# Patient Record
Sex: MALE | Race: WHITE | Employment: FULL TIME | URBAN - METROPOLITAN AREA
[De-identification: names, ages, dates, MRNs, and addresses within clinical notes are randomized per-mention and may not be internally consistent; named-entity substitution may affect disease eponyms.]

---

## 2017-04-07 ENCOUNTER — LAB ENCOUNTER (OUTPATIENT)
Dept: LAB | Age: 62
End: 2017-04-07
Attending: INTERNAL MEDICINE
Payer: COMMERCIAL

## 2017-04-07 DIAGNOSIS — I25.10 CORONARY ATHEROSCLEROSIS OF NATIVE CORONARY ARTERY: Primary | ICD-10-CM

## 2017-04-07 DIAGNOSIS — E78.00 PURE HYPERCHOLESTEROLEMIA: ICD-10-CM

## 2017-04-07 PROCEDURE — 80061 LIPID PANEL: CPT

## 2017-04-07 PROCEDURE — 36415 COLL VENOUS BLD VENIPUNCTURE: CPT

## 2017-04-07 PROCEDURE — 84450 TRANSFERASE (AST) (SGOT): CPT

## 2017-04-07 PROCEDURE — 84460 ALANINE AMINO (ALT) (SGPT): CPT

## 2017-10-11 ENCOUNTER — OFFICE VISIT (OUTPATIENT)
Dept: INTERNAL MEDICINE CLINIC | Facility: CLINIC | Age: 62
End: 2017-10-11

## 2017-10-11 VITALS
HEART RATE: 58 BPM | BODY MASS INDEX: 31.16 KG/M2 | HEIGHT: 65 IN | TEMPERATURE: 98 F | RESPIRATION RATE: 12 BRPM | DIASTOLIC BLOOD PRESSURE: 72 MMHG | WEIGHT: 187 LBS | SYSTOLIC BLOOD PRESSURE: 120 MMHG

## 2017-10-11 DIAGNOSIS — R73.03 PREDIABETES: ICD-10-CM

## 2017-10-11 DIAGNOSIS — E78.00 PURE HYPERCHOLESTEROLEMIA: ICD-10-CM

## 2017-10-11 DIAGNOSIS — Z00.00 ANNUAL PHYSICAL EXAM: Primary | ICD-10-CM

## 2017-10-11 DIAGNOSIS — Z12.5 PROSTATE CANCER SCREENING: ICD-10-CM

## 2017-10-11 DIAGNOSIS — I25.10 CORONARY ARTERY DISEASE INVOLVING NATIVE CORONARY ARTERY OF NATIVE HEART WITHOUT ANGINA PECTORIS: ICD-10-CM

## 2017-10-11 DIAGNOSIS — Z23 NEED FOR VACCINATION: ICD-10-CM

## 2017-10-11 PROCEDURE — 90471 IMMUNIZATION ADMIN: CPT | Performed by: INTERNAL MEDICINE

## 2017-10-11 PROCEDURE — 90686 IIV4 VACC NO PRSV 0.5 ML IM: CPT | Performed by: INTERNAL MEDICINE

## 2017-10-11 PROCEDURE — 99396 PREV VISIT EST AGE 40-64: CPT | Performed by: INTERNAL MEDICINE

## 2017-10-11 RX ORDER — CHLORAL HYDRATE 500 MG
1000 CAPSULE ORAL DAILY
COMMUNITY

## 2017-10-11 NOTE — PROGRESS NOTES
HPI:    Patient ID: Mel Hernandez is a 62year old male. Patient presents today for annual physical.         Review of Systems   Constitutional: Negative for appetite change, chills, fever and unexpected weight change. HENT: Negative.     Eyes: Neg No murmur heard. Pulmonary/Chest: Effort normal and breath sounds normal. No respiratory distress. He has no wheezes. He has no rales. Abdominal: Soft. Bowel sounds are normal. He exhibits no distension and no mass. There is no tenderness.  There is no Imaging & Referrals:  FLULAVAL INFLUENZA VACCINE QUAD PRESERVATIVE FREE 0.5 ML       #[id number]

## 2017-10-23 ENCOUNTER — PRIOR ORIGINAL RECORDS (OUTPATIENT)
Dept: OTHER | Age: 62
End: 2017-10-23

## 2017-10-23 ENCOUNTER — LAB ENCOUNTER (OUTPATIENT)
Dept: LAB | Age: 62
End: 2017-10-23
Attending: INTERNAL MEDICINE
Payer: COMMERCIAL

## 2017-10-23 DIAGNOSIS — Z00.00 ANNUAL PHYSICAL EXAM: ICD-10-CM

## 2017-10-23 DIAGNOSIS — R73.03 PREDIABETES: ICD-10-CM

## 2017-10-23 DIAGNOSIS — Z12.5 PROSTATE CANCER SCREENING: ICD-10-CM

## 2017-10-23 PROCEDURE — 83036 HEMOGLOBIN GLYCOSYLATED A1C: CPT

## 2017-10-23 PROCEDURE — 36415 COLL VENOUS BLD VENIPUNCTURE: CPT

## 2017-10-23 PROCEDURE — 85025 COMPLETE CBC W/AUTO DIFF WBC: CPT

## 2017-10-23 PROCEDURE — 80061 LIPID PANEL: CPT

## 2017-10-23 PROCEDURE — 80053 COMPREHEN METABOLIC PANEL: CPT

## 2017-11-08 ENCOUNTER — MYAURORA ACCOUNT LINK (OUTPATIENT)
Dept: OTHER | Age: 62
End: 2017-11-08

## 2017-11-08 ENCOUNTER — PRIOR ORIGINAL RECORDS (OUTPATIENT)
Dept: OTHER | Age: 62
End: 2017-11-08

## 2017-11-17 LAB
ALBUMIN: 3.8 G/DL
ALKALINE PHOSPHATATE(ALK PHOS): 65 IU/L
ALT (SGPT): 28 U/L
AST (SGOT): 25 U/L
BILIRUBIN TOTAL: 0.7 MG/DL
BUN: 8 MG/DL
CALCIUM: 8.7 MG/DL
CHLORIDE: 108 MEQ/L
CHOLESTEROL, TOTAL: 132 MG/DL
CREATININE, SERUM: 0.65 MG/DL
GLOBULIN: 2.6 G/DL
GLUCOSE: 114 MG/DL
GLUCOSE: 114 MG/DL
HDL CHOLESTEROL: 36 MG/DL
LDL CHOLESTEROL: 70 MG/DL
POTASSIUM, SERUM: 4.1 MEQ/L
PROTEIN, TOTAL: 6.4 G/DL
SGOT (AST): 25 IU/L
SGPT (ALT): 28 IU/L
SODIUM: 141 MEQ/L
TRIGLYCERIDES: 131 MG/DL

## 2018-10-25 ENCOUNTER — OFFICE VISIT (OUTPATIENT)
Dept: INTERNAL MEDICINE CLINIC | Facility: CLINIC | Age: 63
End: 2018-10-25
Payer: COMMERCIAL

## 2018-10-25 VITALS
DIASTOLIC BLOOD PRESSURE: 74 MMHG | BODY MASS INDEX: 31.16 KG/M2 | WEIGHT: 187 LBS | SYSTOLIC BLOOD PRESSURE: 119 MMHG | HEIGHT: 65 IN | HEART RATE: 60 BPM

## 2018-10-25 DIAGNOSIS — E78.00 PURE HYPERCHOLESTEROLEMIA: ICD-10-CM

## 2018-10-25 DIAGNOSIS — H57.9 EYE PROBLEM: ICD-10-CM

## 2018-10-25 DIAGNOSIS — Z00.00 ANNUAL PHYSICAL EXAM: Primary | ICD-10-CM

## 2018-10-25 DIAGNOSIS — I25.10 CAD IN NATIVE ARTERY: ICD-10-CM

## 2018-10-25 DIAGNOSIS — R73.03 PREDIABETES: ICD-10-CM

## 2018-10-25 PROCEDURE — 99396 PREV VISIT EST AGE 40-64: CPT | Performed by: INTERNAL MEDICINE

## 2018-10-25 PROCEDURE — 90471 IMMUNIZATION ADMIN: CPT | Performed by: INTERNAL MEDICINE

## 2018-10-25 PROCEDURE — 90686 IIV4 VACC NO PRSV 0.5 ML IM: CPT | Performed by: INTERNAL MEDICINE

## 2018-10-25 NOTE — PROGRESS NOTES
HPI:    Patient ID: Bradford Alatorre is a 65year old male. Patient presents today for his annual physical.        Review of Systems   Constitutional: Negative. HENT: Negative. Eyes: Negative for visual disturbance.         Tearing of right eye Cardiovascular: Normal rate, regular rhythm, normal heart sounds and intact distal pulses. Exam reveals no gallop and no friction rub. No murmur heard. Pulmonary/Chest: Effort normal and breath sounds normal. No respiratory distress.  He has no wheezes PSA (Screening) [E]      Urinalysis, Routine [E]      Flulaval 6 months and older 0.5 ml Quad PF [[id number]]      Meds This Visit:  Requested Prescriptions      No prescriptions requested or ordered in this encounter       Imaging & Referrals:  Raul Eugene

## 2018-11-21 ENCOUNTER — LAB ENCOUNTER (OUTPATIENT)
Dept: LAB | Age: 63
End: 2018-11-21
Attending: INTERNAL MEDICINE
Payer: COMMERCIAL

## 2018-11-21 ENCOUNTER — PRIOR ORIGINAL RECORDS (OUTPATIENT)
Dept: OTHER | Age: 63
End: 2018-11-21

## 2018-11-21 DIAGNOSIS — Z00.00 ANNUAL PHYSICAL EXAM: ICD-10-CM

## 2018-11-21 PROCEDURE — 36415 COLL VENOUS BLD VENIPUNCTURE: CPT

## 2018-11-21 PROCEDURE — 85025 COMPLETE CBC W/AUTO DIFF WBC: CPT

## 2018-11-21 PROCEDURE — 80053 COMPREHEN METABOLIC PANEL: CPT

## 2018-11-21 PROCEDURE — 80061 LIPID PANEL: CPT

## 2018-11-21 PROCEDURE — 83036 HEMOGLOBIN GLYCOSYLATED A1C: CPT

## 2018-11-21 PROCEDURE — 81003 URINALYSIS AUTO W/O SCOPE: CPT

## 2018-11-27 LAB
ALBUMIN: 3.8 G/DL
ALKALINE PHOSPHATATE(ALK PHOS): 56 IU/L
ALT (SGPT): 33 U/L
AST (SGOT): 26 U/L
BILIRUBIN TOTAL: 0.8 MG/DL
BUN: 12 MG/DL
CALCIUM: 8.5 MG/DL
CHLORIDE: 106 MEQ/L
CHOLESTEROL, TOTAL: 143 MG/DL
CREATININE, SERUM: 0.73 MG/DL
GLUCOSE: 103 MG/DL
GLUCOSE: 103 MG/DL
HDL CHOLESTEROL: 35 MG/DL
HEMATOCRIT: 40.3 %
HEMOGLOBIN A1C: 5.7 %
HEMOGLOBIN: 13.7 G/DL
LDL CHOLESTEROL: 80 MG/DL
NON-HDL CHOLESTEROL: 108 MG/DL
PLATELETS: 174 K/UL
POTASSIUM, SERUM: 4 MEQ/L
PROTEIN, TOTAL: 6 G/DL
RED BLOOD COUNT: 4.4 X 10-6/U
SGOT (AST): 26 IU/L
SGPT (ALT): 33 IU/L
SODIUM: 137 MEQ/L
TRIGLYCERIDES: 140 MG/DL
WHITE BLOOD COUNT: 4.5 X 10-3/U

## 2018-12-05 ENCOUNTER — PRIOR ORIGINAL RECORDS (OUTPATIENT)
Dept: OTHER | Age: 63
End: 2018-12-05

## 2019-02-15 ENCOUNTER — OFFICE VISIT (OUTPATIENT)
Dept: INTERNAL MEDICINE CLINIC | Facility: CLINIC | Age: 64
End: 2019-02-15
Payer: COMMERCIAL

## 2019-02-15 VITALS
BODY MASS INDEX: 31.16 KG/M2 | DIASTOLIC BLOOD PRESSURE: 58 MMHG | HEART RATE: 61 BPM | RESPIRATION RATE: 12 BRPM | WEIGHT: 187 LBS | SYSTOLIC BLOOD PRESSURE: 95 MMHG | HEIGHT: 65 IN | TEMPERATURE: 97 F

## 2019-02-15 DIAGNOSIS — D17.23 LIPOMA OF RIGHT THIGH: Primary | ICD-10-CM

## 2019-02-15 DIAGNOSIS — I83.811 VARICOSE VEINS OF LEG WITH PAIN, RIGHT: ICD-10-CM

## 2019-02-15 PROCEDURE — 99214 OFFICE O/P EST MOD 30 MIN: CPT | Performed by: INTERNAL MEDICINE

## 2019-02-15 PROCEDURE — 99212 OFFICE O/P EST SF 10 MIN: CPT | Performed by: INTERNAL MEDICINE

## 2019-02-15 NOTE — PROGRESS NOTES
HPI:    Patient ID: Harry Avery is a 59year old male. Mass   This is a chronic (initially had one lump on the back of the thigh and now became 2) problem. The current episode started more than 1 year ago. The problem occurs constantly.  The proble Ear: External ear normal.   Left Ear: External ear normal.   Nose: Nose normal.   Mouth/Throat: Oropharynx is clear and moist. No oropharyngeal exudate. Eyes: Conjunctivae are normal. Right eye exhibits no discharge. Left eye exhibits no discharge.  No sc encounter       Imaging & Referrals:  None       #[id number]

## 2019-02-28 VITALS
HEIGHT: 65 IN | DIASTOLIC BLOOD PRESSURE: 52 MMHG | WEIGHT: 185 LBS | SYSTOLIC BLOOD PRESSURE: 102 MMHG | BODY MASS INDEX: 30.82 KG/M2 | OXYGEN SATURATION: 95 % | HEART RATE: 84 BPM | RESPIRATION RATE: 19 BRPM

## 2019-02-28 VITALS
HEIGHT: 65 IN | SYSTOLIC BLOOD PRESSURE: 118 MMHG | WEIGHT: 187 LBS | DIASTOLIC BLOOD PRESSURE: 70 MMHG | RESPIRATION RATE: 18 BRPM | BODY MASS INDEX: 31.16 KG/M2 | HEART RATE: 55 BPM

## 2019-04-08 RX ORDER — BISOPROLOL FUMARATE 5 MG/1
5 TABLET, FILM COATED ORAL DAILY
COMMUNITY
Start: 2018-12-27 | End: 2020-03-06 | Stop reason: SDUPTHER

## 2019-04-08 RX ORDER — ASPIRIN 81 MG/1
81 TABLET ORAL DAILY
COMMUNITY
Start: 2017-10-16

## 2019-04-08 RX ORDER — ATORVASTATIN CALCIUM 40 MG/1
40 TABLET, FILM COATED ORAL NIGHTLY
COMMUNITY
Start: 2018-12-27 | End: 2020-03-06 | Stop reason: SDUPTHER

## 2019-04-08 RX ORDER — ENALAPRIL MALEATE 2.5 MG/1
2.5 TABLET ORAL DAILY
COMMUNITY
Start: 2018-04-30 | End: 2019-12-09 | Stop reason: SDUPTHER

## 2019-04-08 RX ORDER — HYDROGEN PEROXIDE 2.65 ML/100ML
LIQUID ORAL; TOPICAL
Qty: 90 TABLET | Refills: 5 | Status: SHIPPED | OUTPATIENT
Start: 2019-04-08 | End: 2019-12-09 | Stop reason: SDUPTHER

## 2019-04-08 RX ORDER — ENALAPRIL MALEATE 2.5 MG/1
TABLET ORAL
Qty: 90 TABLET | Refills: 1 | Status: SHIPPED | OUTPATIENT
Start: 2019-04-08 | End: 2020-03-06 | Stop reason: SDUPTHER

## 2019-04-18 RX ORDER — DOCUSATE SODIUM 50 MG/5ML
LIQUID ORAL
COMMUNITY

## 2019-11-27 ENCOUNTER — OFFICE VISIT (OUTPATIENT)
Dept: INTERNAL MEDICINE CLINIC | Facility: CLINIC | Age: 64
End: 2019-11-27
Payer: COMMERCIAL

## 2019-11-27 VITALS
BODY MASS INDEX: 30.49 KG/M2 | HEIGHT: 65 IN | WEIGHT: 183 LBS | SYSTOLIC BLOOD PRESSURE: 117 MMHG | TEMPERATURE: 97 F | HEART RATE: 98 BPM | DIASTOLIC BLOOD PRESSURE: 62 MMHG

## 2019-11-27 DIAGNOSIS — Z01.00 DIABETIC EYE EXAM (HCC): ICD-10-CM

## 2019-11-27 DIAGNOSIS — I25.10 CORONARY ARTERY DISEASE INVOLVING NATIVE CORONARY ARTERY OF NATIVE HEART WITHOUT ANGINA PECTORIS: ICD-10-CM

## 2019-11-27 DIAGNOSIS — Z00.00 ANNUAL PHYSICAL EXAM: Primary | ICD-10-CM

## 2019-11-27 DIAGNOSIS — E11.9 CONTROLLED TYPE 2 DIABETES MELLITUS WITHOUT COMPLICATION, WITHOUT LONG-TERM CURRENT USE OF INSULIN (HCC): ICD-10-CM

## 2019-11-27 DIAGNOSIS — E78.00 PURE HYPERCHOLESTEROLEMIA: ICD-10-CM

## 2019-11-27 DIAGNOSIS — E11.9 DIABETIC EYE EXAM (HCC): ICD-10-CM

## 2019-11-27 PROCEDURE — 90686 IIV4 VACC NO PRSV 0.5 ML IM: CPT | Performed by: INTERNAL MEDICINE

## 2019-11-27 PROCEDURE — 90471 IMMUNIZATION ADMIN: CPT | Performed by: INTERNAL MEDICINE

## 2019-11-27 PROCEDURE — 99396 PREV VISIT EST AGE 40-64: CPT | Performed by: INTERNAL MEDICINE

## 2019-11-27 NOTE — PROGRESS NOTES
HPI:    Patient ID: Dwayne Casillas is a 62year old male. Patient presents today for her annual physical.       Review of Systems   Constitutional: Negative. HENT: Negative. Eyes: Negative.     Respiratory: Negative for cough, shortness of breat rhythm, normal heart sounds and intact distal pulses. Exam reveals no gallop. No murmur heard. Pulmonary/Chest: Effort normal and breath sounds normal. No respiratory distress. He has no wheezes. He has no rales. Abdominal: Soft.  Bowel sounds are norm Imaging & Referrals:  FLULAVAL INFLUENZA VACCINE QUAD PRESERVATIVE FREE 0.5 ML  OPHTHALMOLOGY - INTERNAL       JQ#[id number]

## 2019-12-03 ENCOUNTER — LAB ENCOUNTER (OUTPATIENT)
Dept: LAB | Age: 64
End: 2019-12-03
Attending: INTERNAL MEDICINE
Payer: COMMERCIAL

## 2019-12-03 DIAGNOSIS — Z00.00 ANNUAL PHYSICAL EXAM: ICD-10-CM

## 2019-12-03 LAB
ALBUMIN SERPL-MCNC: 3.7 G/DL
ALBUMIN/GLOB SERPL: NORMAL {RATIO}
ALP SERPL-CCNC: 81 U/L
ALT SERPL-CCNC: 35 U/L
ANION GAP SERPL CALC-SCNC: NORMAL MMOL/L
AST SERPL-CCNC: 23 U/L
BILIRUB SERPL-MCNC: 0.7 MG/DL
BUN SERPL-MCNC: 11 MG/DL
BUN/CREAT SERPL: NORMAL
CALCIUM SERPL-MCNC: 8.6 MG/DL
CHLORIDE SERPL-SCNC: 107 MMOL/L
CHOLEST SERPL-MCNC: 153 MG/DL
CHOLEST/HDLC SERPL: NORMAL {RATIO}
CO2 SERPL-SCNC: NORMAL MMOL/L
CREAT SERPL-MCNC: 0.68 MG/DL
GLOBULIN SER-MCNC: 2.9 G/DL
GLUCOSE SERPL-MCNC: 106 MG/DL
HDLC SERPL-MCNC: 39 MG/DL
LDLC SERPL CALC-MCNC: 77 MG/DL
LENGTH OF FAST TIME PATIENT: NORMAL H
LENGTH OF FAST TIME PATIENT: NORMAL H
NONHDLC SERPL-MCNC: 114 MG/DL
POTASSIUM SERPL-SCNC: 4.3 MMOL/L
PROT SERPL-MCNC: 6.6 G/DL
SODIUM SERPL-SCNC: 140 MMOL/L
TRIGL SERPL-MCNC: 184 MG/DL
VLDLC SERPL CALC-MCNC: NORMAL MG/DL

## 2019-12-03 PROCEDURE — 80053 COMPREHEN METABOLIC PANEL: CPT

## 2019-12-03 PROCEDURE — 80061 LIPID PANEL: CPT

## 2019-12-03 PROCEDURE — 85025 COMPLETE CBC W/AUTO DIFF WBC: CPT

## 2019-12-03 PROCEDURE — 36415 COLL VENOUS BLD VENIPUNCTURE: CPT

## 2019-12-03 PROCEDURE — 83036 HEMOGLOBIN GLYCOSYLATED A1C: CPT

## 2019-12-06 PROBLEM — E11.9 DIABETES (CMD): Status: ACTIVE | Noted: 2019-12-06

## 2019-12-06 PROBLEM — I50.9 CONGESTIVE HEART FAILURE (CHF) (CMD): Status: ACTIVE | Noted: 2019-12-06

## 2019-12-06 PROBLEM — Z95.1 S/P CABG (CORONARY ARTERY BYPASS GRAFT): Status: ACTIVE | Noted: 2019-12-06

## 2019-12-06 PROBLEM — I10 ESSENTIAL HYPERTENSION: Status: ACTIVE | Noted: 2019-12-06

## 2019-12-06 PROBLEM — I25.10 CAD (CORONARY ARTERY DISEASE): Status: ACTIVE | Noted: 2019-12-06

## 2019-12-06 PROBLEM — E78.5 DYSLIPIDEMIA: Status: ACTIVE | Noted: 2019-12-06

## 2019-12-06 PROBLEM — I42.0 DILATED CARDIOMYOPATHY (CMD): Status: ACTIVE | Noted: 2019-12-06

## 2019-12-09 ENCOUNTER — OFFICE VISIT (OUTPATIENT)
Dept: CARDIOLOGY | Age: 64
End: 2019-12-09

## 2019-12-09 VITALS
RESPIRATION RATE: 18 BRPM | SYSTOLIC BLOOD PRESSURE: 94 MMHG | HEART RATE: 74 BPM | WEIGHT: 181 LBS | HEIGHT: 65 IN | DIASTOLIC BLOOD PRESSURE: 56 MMHG | OXYGEN SATURATION: 97 % | BODY MASS INDEX: 30.16 KG/M2

## 2019-12-09 DIAGNOSIS — E78.5 DYSLIPIDEMIA: Primary | ICD-10-CM

## 2019-12-09 PROCEDURE — 3074F SYST BP LT 130 MM HG: CPT | Performed by: INTERNAL MEDICINE

## 2019-12-09 PROCEDURE — 3078F DIAST BP <80 MM HG: CPT | Performed by: INTERNAL MEDICINE

## 2019-12-09 PROCEDURE — 99214 OFFICE O/P EST MOD 30 MIN: CPT | Performed by: INTERNAL MEDICINE

## 2019-12-09 RX ORDER — CHLORAL HYDRATE 500 MG
2 CAPSULE ORAL DAILY
COMMUNITY

## 2019-12-09 ASSESSMENT — PATIENT HEALTH QUESTIONNAIRE - PHQ9
2. FEELING DOWN, DEPRESSED OR HOPELESS: NOT AT ALL
SUM OF ALL RESPONSES TO PHQ9 QUESTIONS 1 AND 2: 0
1. LITTLE INTEREST OR PLEASURE IN DOING THINGS: NOT AT ALL
SUM OF ALL RESPONSES TO PHQ9 QUESTIONS 1 AND 2: 0

## 2019-12-10 DIAGNOSIS — R73.01 ELEVATED FASTING GLUCOSE: Primary | ICD-10-CM

## 2020-03-06 RX ORDER — ENALAPRIL MALEATE 2.5 MG/1
TABLET ORAL
Qty: 90 TABLET | Refills: 2 | Status: SHIPPED | OUTPATIENT
Start: 2020-03-06 | End: 2021-05-28

## 2020-03-06 RX ORDER — BISOPROLOL FUMARATE 5 MG/1
TABLET, FILM COATED ORAL
Qty: 90 TABLET | Refills: 2 | Status: SHIPPED | OUTPATIENT
Start: 2020-03-06 | End: 2021-02-15

## 2020-03-06 RX ORDER — ATORVASTATIN CALCIUM 40 MG/1
TABLET, FILM COATED ORAL
Qty: 90 TABLET | Refills: 2 | Status: SHIPPED | OUTPATIENT
Start: 2020-03-06 | End: 2020-11-13

## 2020-04-02 ENCOUNTER — TELEPHONE (OUTPATIENT)
Dept: INTERNAL MEDICINE CLINIC | Facility: CLINIC | Age: 65
End: 2020-04-02

## 2020-04-02 NOTE — TELEPHONE ENCOUNTER
Patient's daughter called looking to see if her father can get a note for his work to take 2 weeks off work due to current events with the [employer]. He is Cardiac patient who had a Quadruple Bypass surgery at [facility] a few years ago.     He is worri

## 2020-04-15 ENCOUNTER — TELEPHONE (OUTPATIENT)
Dept: INTERNAL MEDICINE CLINIC | Facility: CLINIC | Age: 65
End: 2020-04-15

## 2020-04-15 NOTE — TELEPHONE ENCOUNTER
Patient's daughter Tanvi Garcia states patient works at [employer] and is requesting to stay home an additional 2 weeks from work due to him being considered high risk for COVID-19. Patient is requesting note to start on 04/17/2020 until 05/01/2020. Please advise.

## 2020-11-13 RX ORDER — ATORVASTATIN CALCIUM 40 MG/1
TABLET, FILM COATED ORAL
Qty: 90 TABLET | Refills: 0 | Status: SHIPPED | OUTPATIENT
Start: 2020-11-13 | End: 2021-02-15

## 2020-12-02 ENCOUNTER — OFFICE VISIT (OUTPATIENT)
Dept: INTERNAL MEDICINE CLINIC | Facility: CLINIC | Age: 65
End: 2020-12-02
Payer: COMMERCIAL

## 2020-12-02 ENCOUNTER — LAB ENCOUNTER (OUTPATIENT)
Dept: LAB | Age: 65
End: 2020-12-02
Attending: INTERNAL MEDICINE
Payer: COMMERCIAL

## 2020-12-02 VITALS
SYSTOLIC BLOOD PRESSURE: 110 MMHG | BODY MASS INDEX: 31.07 KG/M2 | DIASTOLIC BLOOD PRESSURE: 59 MMHG | HEIGHT: 65 IN | HEART RATE: 58 BPM | WEIGHT: 186.5 LBS | TEMPERATURE: 97 F

## 2020-12-02 DIAGNOSIS — E78.00 PURE HYPERCHOLESTEROLEMIA: ICD-10-CM

## 2020-12-02 DIAGNOSIS — Z00.00 ANNUAL PHYSICAL EXAM: Primary | ICD-10-CM

## 2020-12-02 DIAGNOSIS — R73.03 PREDIABETES: ICD-10-CM

## 2020-12-02 DIAGNOSIS — I25.10 CORONARY ARTERY DISEASE INVOLVING NATIVE CORONARY ARTERY OF NATIVE HEART WITHOUT ANGINA PECTORIS: ICD-10-CM

## 2020-12-02 DIAGNOSIS — Z00.00 ANNUAL PHYSICAL EXAM: ICD-10-CM

## 2020-12-02 LAB
ALBUMIN SERPL-MCNC: 3.9 G/DL
ALBUMIN/GLOB SERPL: 1.3 {RATIO}
ALP SERPL-CCNC: 89 U/L
ALT SERPL-CCNC: 44 UNITS/L
ANION GAP SERPL CALC-SCNC: 5 MMOL/L
AST SERPL-CCNC: 22 UNITS/L
BILIRUB SERPL-MCNC: 1 MG/DL
BUN SERPL-MCNC: 23 MG/DL
BUN/CREAT SERPL: 29.5
CALCIUM SERPL-MCNC: 8.8 MG/DL
CHLORIDE SERPL-SCNC: 110 MMOL/L
CHOLEST SERPL-MCNC: 144 MG/DL
CO2 SERPL-SCNC: 28 MMOL/L
CREAT SERPL-MCNC: 0.78 MG/DL
GLOBULIN SER-MCNC: 3.1 G/DL
GLUCOSE SERPL-MCNC: 107 MG/DL
HCT VFR BLD CALC: 42.7 %
HDLC SERPL-MCNC: 42 MG/DL
HGB BLD-MCNC: 14.3 G/DL
LDLC SERPL CALC-MCNC: 73 MG/DL
LENGTH OF FAST TIME PATIENT: YES H
LENGTH OF FAST TIME PATIENT: YES H
MCH RBC QN AUTO: 31.5 PG
MCHC RBC AUTO-ENTMCNC: 33.5 G/DL
MCV RBC AUTO: 94.1 FL
NONHDLC SERPL-MCNC: 102 MG/DL
PLATELET # BLD: 189 K/MCL
POTASSIUM SERPL-SCNC: 4.2 MMOL/L
PROT SERPL-MCNC: 7 G/DL
RBC # BLD: 4.54 10*6/UL
SODIUM SERPL-SCNC: 143 MMOL/L
TRIGL SERPL-MCNC: 146 MG/DL
VLDLC SERPL CALC-MCNC: 29 MG/DL
WBC # BLD: 4.6 K/MCL

## 2020-12-02 PROCEDURE — 99396 PREV VISIT EST AGE 40-64: CPT | Performed by: INTERNAL MEDICINE

## 2020-12-02 PROCEDURE — 80061 LIPID PANEL: CPT

## 2020-12-02 PROCEDURE — 36415 COLL VENOUS BLD VENIPUNCTURE: CPT

## 2020-12-02 PROCEDURE — 80053 COMPREHEN METABOLIC PANEL: CPT

## 2020-12-02 PROCEDURE — 83036 HEMOGLOBIN GLYCOSYLATED A1C: CPT

## 2020-12-02 PROCEDURE — 3074F SYST BP LT 130 MM HG: CPT | Performed by: INTERNAL MEDICINE

## 2020-12-02 PROCEDURE — 3078F DIAST BP <80 MM HG: CPT | Performed by: INTERNAL MEDICINE

## 2020-12-02 PROCEDURE — 3008F BODY MASS INDEX DOCD: CPT | Performed by: INTERNAL MEDICINE

## 2020-12-02 PROCEDURE — 85025 COMPLETE CBC W/AUTO DIFF WBC: CPT

## 2020-12-02 NOTE — PROGRESS NOTES
HPI:    Patient ID: Linda Jaime is a 60year old male. Patient comes in today for his annual physical.  Otherwise has no specific complaints. Has a known history of CAD status post CABG, hyperlipidemia and prediabetes.       Review of Systems   Co Packs/day: 1.00        Years: 30.00        Pack years: 30        Types: Cigarettes      Smokeless tobacco: Never Used    Alcohol use: Yes      Alcohol/week: 0.0 standard drinks      Frequency: Monthly or less      Comment: occasionally    Drug use:  No exam  (primary encounter diagnosis)  Plan: CBC WITH DIFFERENTIAL WITH PLATELET, COMP         METABOLIC PANEL (14), HEMOGLOBIN A1C, LIPID         PANEL, PSA SCREEN        Routine labs have been ordered.   Patient states that he is ready had his flu shot from

## 2020-12-23 ENCOUNTER — OFFICE VISIT (OUTPATIENT)
Dept: CARDIOLOGY | Age: 65
End: 2020-12-23

## 2020-12-23 VITALS
DIASTOLIC BLOOD PRESSURE: 54 MMHG | WEIGHT: 184 LBS | BODY MASS INDEX: 30.66 KG/M2 | HEART RATE: 65 BPM | RESPIRATION RATE: 18 BRPM | SYSTOLIC BLOOD PRESSURE: 100 MMHG | OXYGEN SATURATION: 96 % | HEIGHT: 65 IN

## 2020-12-23 DIAGNOSIS — I25.10 CORONARY ARTERY DISEASE INVOLVING NATIVE CORONARY ARTERY OF NATIVE HEART WITHOUT ANGINA PECTORIS: Primary | ICD-10-CM

## 2020-12-23 DIAGNOSIS — E78.5 DYSLIPIDEMIA: ICD-10-CM

## 2020-12-23 PROCEDURE — 3078F DIAST BP <80 MM HG: CPT | Performed by: INTERNAL MEDICINE

## 2020-12-23 PROCEDURE — 3074F SYST BP LT 130 MM HG: CPT | Performed by: INTERNAL MEDICINE

## 2020-12-23 PROCEDURE — 99214 OFFICE O/P EST MOD 30 MIN: CPT | Performed by: INTERNAL MEDICINE

## 2020-12-23 ASSESSMENT — PATIENT HEALTH QUESTIONNAIRE - PHQ9
2. FEELING DOWN, DEPRESSED OR HOPELESS: NOT AT ALL
SUM OF ALL RESPONSES TO PHQ9 QUESTIONS 1 AND 2: 0
1. LITTLE INTEREST OR PLEASURE IN DOING THINGS: NOT AT ALL
CLINICAL INTERPRETATION OF PHQ2 SCORE: NO FURTHER SCREENING NEEDED
SUM OF ALL RESPONSES TO PHQ9 QUESTIONS 1 AND 2: 0
CLINICAL INTERPRETATION OF PHQ9 SCORE: NO FURTHER SCREENING NEEDED

## 2021-02-15 RX ORDER — BISOPROLOL FUMARATE 5 MG/1
TABLET, FILM COATED ORAL
Qty: 90 TABLET | Refills: 2 | Status: SHIPPED | OUTPATIENT
Start: 2021-02-15

## 2021-02-15 RX ORDER — ATORVASTATIN CALCIUM 40 MG/1
TABLET, FILM COATED ORAL
Qty: 90 TABLET | Refills: 2 | Status: SHIPPED | OUTPATIENT
Start: 2021-02-15

## 2021-05-28 RX ORDER — ENALAPRIL MALEATE 2.5 MG/1
TABLET ORAL
Qty: 90 TABLET | Refills: 2 | Status: SHIPPED | OUTPATIENT
Start: 2021-05-28

## 2021-12-21 ENCOUNTER — TELEPHONE (OUTPATIENT)
Dept: INTERNAL MEDICINE CLINIC | Facility: CLINIC | Age: 66
End: 2021-12-21

## 2021-12-21 NOTE — TELEPHONE ENCOUNTER
Daughter, phoned back and did schedule an appt for the patient to see Dr. Jackie Esquivel on 12-23-21 at 9:30. This was approved per the doctors message below.

## 2021-12-21 NOTE — TELEPHONE ENCOUNTER
1st attempt/left voice mail message for pt to call back. Please, see message below when the call is returned.

## 2021-12-21 NOTE — TELEPHONE ENCOUNTER
Talked to Machado Balkenyetta daughter of patient she will tell her mom to call office, see message below.

## 2021-12-23 ENCOUNTER — LAB ENCOUNTER (OUTPATIENT)
Dept: LAB | Age: 66
End: 2021-12-23
Attending: INTERNAL MEDICINE
Payer: MEDICARE

## 2021-12-23 ENCOUNTER — OFFICE VISIT (OUTPATIENT)
Dept: INTERNAL MEDICINE CLINIC | Facility: CLINIC | Age: 66
End: 2021-12-23
Payer: MEDICARE

## 2021-12-23 VITALS
WEIGHT: 188.31 LBS | TEMPERATURE: 98 F | OXYGEN SATURATION: 99 % | DIASTOLIC BLOOD PRESSURE: 71 MMHG | BODY MASS INDEX: 31.38 KG/M2 | HEIGHT: 65 IN | HEART RATE: 60 BPM | SYSTOLIC BLOOD PRESSURE: 119 MMHG

## 2021-12-23 DIAGNOSIS — Z00.00 MEDICARE ANNUAL WELLNESS VISIT, INITIAL: Primary | ICD-10-CM

## 2021-12-23 DIAGNOSIS — Z87.891 HISTORY OF SMOKING 30 OR MORE PACK YEARS: ICD-10-CM

## 2021-12-23 DIAGNOSIS — I83.811 VARICOSE VEINS OF LEG WITH PAIN, RIGHT: ICD-10-CM

## 2021-12-23 DIAGNOSIS — E78.5 HYPERLIPIDEMIA ASSOCIATED WITH TYPE 2 DIABETES MELLITUS (HCC): ICD-10-CM

## 2021-12-23 DIAGNOSIS — Z12.5 PROSTATE CANCER SCREENING: ICD-10-CM

## 2021-12-23 DIAGNOSIS — I25.10 CORONARY ARTERY DISEASE INVOLVING NATIVE CORONARY ARTERY OF NATIVE HEART WITHOUT ANGINA PECTORIS: ICD-10-CM

## 2021-12-23 DIAGNOSIS — Z01.00 DIABETIC EYE EXAM (HCC): ICD-10-CM

## 2021-12-23 DIAGNOSIS — Z00.00 MEDICARE ANNUAL WELLNESS VISIT, INITIAL: ICD-10-CM

## 2021-12-23 DIAGNOSIS — E11.69 HYPERLIPIDEMIA ASSOCIATED WITH TYPE 2 DIABETES MELLITUS (HCC): ICD-10-CM

## 2021-12-23 DIAGNOSIS — Z13.6 SCREENING FOR AAA (AORTIC ABDOMINAL ANEURYSM): ICD-10-CM

## 2021-12-23 DIAGNOSIS — E11.9 DIABETIC EYE EXAM (HCC): ICD-10-CM

## 2021-12-23 DIAGNOSIS — E11.9 CONTROLLED TYPE 2 DIABETES MELLITUS WITHOUT COMPLICATION, WITHOUT LONG-TERM CURRENT USE OF INSULIN (HCC): ICD-10-CM

## 2021-12-23 PROBLEM — E78.00 PURE HYPERCHOLESTEROLEMIA: Status: RESOLVED | Noted: 2018-10-25 | Resolved: 2021-12-23

## 2021-12-23 PROBLEM — H57.9 EYE PROBLEM: Status: RESOLVED | Noted: 2018-10-25 | Resolved: 2021-12-23

## 2021-12-23 PROCEDURE — 82570 ASSAY OF URINE CREATININE: CPT

## 2021-12-23 PROCEDURE — 82043 UR ALBUMIN QUANTITATIVE: CPT

## 2021-12-23 PROCEDURE — G0009 ADMIN PNEUMOCOCCAL VACCINE: HCPCS | Performed by: INTERNAL MEDICINE

## 2021-12-23 PROCEDURE — 3078F DIAST BP <80 MM HG: CPT | Performed by: INTERNAL MEDICINE

## 2021-12-23 PROCEDURE — 99397 PER PM REEVAL EST PAT 65+ YR: CPT | Performed by: INTERNAL MEDICINE

## 2021-12-23 PROCEDURE — 3074F SYST BP LT 130 MM HG: CPT | Performed by: INTERNAL MEDICINE

## 2021-12-23 PROCEDURE — 80053 COMPREHEN METABOLIC PANEL: CPT

## 2021-12-23 PROCEDURE — 80061 LIPID PANEL: CPT

## 2021-12-23 PROCEDURE — 96160 PT-FOCUSED HLTH RISK ASSMT: CPT | Performed by: INTERNAL MEDICINE

## 2021-12-23 PROCEDURE — 3008F BODY MASS INDEX DOCD: CPT | Performed by: INTERNAL MEDICINE

## 2021-12-23 PROCEDURE — 85025 COMPLETE CBC W/AUTO DIFF WBC: CPT

## 2021-12-23 PROCEDURE — G0402 INITIAL PREVENTIVE EXAM: HCPCS | Performed by: INTERNAL MEDICINE

## 2021-12-23 PROCEDURE — G0008 ADMIN INFLUENZA VIRUS VAC: HCPCS | Performed by: INTERNAL MEDICINE

## 2021-12-23 PROCEDURE — 36415 COLL VENOUS BLD VENIPUNCTURE: CPT

## 2021-12-23 PROCEDURE — 90732 PPSV23 VACC 2 YRS+ SUBQ/IM: CPT | Performed by: INTERNAL MEDICINE

## 2021-12-23 PROCEDURE — 90662 IIV NO PRSV INCREASED AG IM: CPT | Performed by: INTERNAL MEDICINE

## 2021-12-23 PROCEDURE — 83036 HEMOGLOBIN GLYCOSYLATED A1C: CPT

## 2021-12-23 NOTE — PROGRESS NOTES
HPI:   Mel Hernandez is a 73year old male who presents for a Medicare Subsequent Annual Wellness visit (Pt already had Initial Annual Wellness). No topic due editable text        He has been screened for Falls and is low risk.     Cognitive Assess of it for scanning into Epic. He smoked tobacco in the past but quit greater than 12 months ago.   Social History    Tobacco Use      Smoking status: Former Smoker        Packs/day: 1.00        Years: 30.00        Pack years: 30        Types: Cigarett MOUTH EVERY DAY  Bisoprolol Fumarate (ZEBETA) 5 MG Oral Tab, Take 1 tablet by mouth daily. docusate sodium 100 MG Oral Cap, Take 2 capsules by mouth daily. aspirin 81 MG Oral Tab, Take 81 mg by mouth daily.        MEDICAL INFORMATION:   He  has a past med (85.4 kg).     Medicare Hearing Assessment  (Required for AWV/SWV)    Hearing Screening    Time taken: 12/23/2021  9:09 AM  Screening Method: Questionnaire  I have a problem hearing over the telephone: No I have trouble following the conversations when two no adenopathy, thyroid: not enlarged, symmetric, no tenderness/mass/nodules, no carotid bruit or JVD   Back:   Symmetric, no curvature, ROM normal, no CVA tenderness   Lungs:   Clear to auscultation bilaterally, respirations unlabored   Chest Wall:  No ten polyp. He is due in 2024.   We will also check his PSA.    (Z12.5) Prostate cancer screening  Plan: PSA SCREEN        Check PSA.    (Z01.00,  E11.9) [facility]  Plan: OPHTHALMOLOGY - INTERNAL        Referral to ophthalmologist given.    (Z87.8 on file.      Rozina Chaparro MD, 12/23/2021     General Health     In the past six months, have you lost more than 10 pounds without trying?: 2 - No  Has your appetite been poor?: No  How does the patient maintain a good energy level?: Stretching  How high risk or previous colonoscopy was abnormal -    Colonoscopy Never done    Flexible Sigmoidoscopy   Covered every 4 years -    Fecal Occult Blood Test Covered annually -   Prostate Cancer Screening    Prostate-Specific Antigen (PSA) Annually Lab Results Annually No results found for: DIGOXIN, DIG, VALP

## 2021-12-27 ENCOUNTER — TELEPHONE (OUTPATIENT)
Dept: INTERNAL MEDICINE CLINIC | Facility: CLINIC | Age: 66
End: 2021-12-27

## 2021-12-27 DIAGNOSIS — R97.20 PSA ELEVATION: Primary | ICD-10-CM

## 2022-01-27 ENCOUNTER — LAB ENCOUNTER (OUTPATIENT)
Dept: LAB | Age: 67
End: 2022-01-27
Attending: INTERNAL MEDICINE
Payer: MEDICARE

## 2022-01-27 DIAGNOSIS — R97.20 PSA ELEVATION: ICD-10-CM

## 2022-01-27 LAB — PSA SERPL-MCNC: 2.42 NG/ML (ref ?–4)

## 2022-01-27 PROCEDURE — 84153 ASSAY OF PSA TOTAL: CPT

## 2022-01-27 PROCEDURE — 36415 COLL VENOUS BLD VENIPUNCTURE: CPT

## 2022-04-25 RX ORDER — ATORVASTATIN CALCIUM 80 MG/1
TABLET, FILM COATED ORAL
Qty: 90 TABLET | Refills: 1 | Status: SHIPPED | OUTPATIENT
Start: 2022-04-25

## 2022-05-27 ENCOUNTER — LAB ENCOUNTER (OUTPATIENT)
Dept: LAB | Age: 67
End: 2022-05-27
Attending: INTERNAL MEDICINE
Payer: MEDICARE

## 2022-05-27 DIAGNOSIS — E78.5 DYSLIPIDEMIA: Primary | ICD-10-CM

## 2022-05-27 LAB
ALT SERPL-CCNC: 38 U/L
AST SERPL-CCNC: 22 U/L (ref 15–37)
CHOLEST SERPL-MCNC: 134 MG/DL (ref ?–200)
FASTING PATIENT LIPID ANSWER: YES
HDLC SERPL-MCNC: 41 MG/DL (ref 40–59)
LDLC SERPL CALC-MCNC: 69 MG/DL (ref ?–100)
NONHDLC SERPL-MCNC: 93 MG/DL (ref ?–130)
TRIGL SERPL-MCNC: 135 MG/DL (ref 30–149)
VLDLC SERPL CALC-MCNC: 21 MG/DL (ref 0–30)

## 2022-05-27 PROCEDURE — 84460 ALANINE AMINO (ALT) (SGPT): CPT

## 2022-05-27 PROCEDURE — 36415 COLL VENOUS BLD VENIPUNCTURE: CPT

## 2022-05-27 PROCEDURE — 84450 TRANSFERASE (AST) (SGOT): CPT

## 2022-05-27 PROCEDURE — 80061 LIPID PANEL: CPT

## 2022-09-12 ENCOUNTER — NURSE TRIAGE (OUTPATIENT)
Dept: INTERNAL MEDICINE CLINIC | Facility: CLINIC | Age: 67
End: 2022-09-12

## 2022-09-22 ENCOUNTER — TELEPHONE (OUTPATIENT)
Dept: INTERNAL MEDICINE CLINIC | Facility: CLINIC | Age: 67
End: 2022-09-22

## 2022-11-14 ENCOUNTER — TELEPHONE (OUTPATIENT)
Dept: INTERNAL MEDICINE CLINIC | Facility: CLINIC | Age: 67
End: 2022-11-14

## 2022-11-14 NOTE — TELEPHONE ENCOUNTER
Unable to reach patient for medication adherence consult [facility]  TQ[facility][id number]. LVM for patient to call me back.

## 2022-11-15 NOTE — TELEPHONE ENCOUNTER
Attempted to reach patient for medication adherence consult via [facility]  QM#[id number]. Unable to reach x2. Will close encounter.

## 2022-12-21 ENCOUNTER — HOSPITAL ENCOUNTER (OUTPATIENT)
Dept: ULTRASOUND IMAGING | Age: 67
Discharge: HOME | End: 2022-12-21
Attending: INTERNAL MEDICINE
Payer: MEDICARE

## 2022-12-21 DIAGNOSIS — Z13.6 SCREENING FOR AAA (AORTIC ABDOMINAL ANEURYSM): ICD-10-CM

## 2022-12-21 PROCEDURE — 76706 US ABDL AORTA SCREEN AAA: CPT | Performed by: INTERNAL MEDICINE

## 2023-01-04 ENCOUNTER — OFFICE VISIT (OUTPATIENT)
Dept: INTERNAL MEDICINE CLINIC | Facility: CLINIC | Age: 68
End: 2023-01-04
Payer: MEDICARE

## 2023-01-04 VITALS
DIASTOLIC BLOOD PRESSURE: 66 MMHG | OXYGEN SATURATION: 95 % | WEIGHT: 187.19 LBS | TEMPERATURE: 98 F | SYSTOLIC BLOOD PRESSURE: 116 MMHG | HEART RATE: 62 BPM | BODY MASS INDEX: 31.19 KG/M2 | HEIGHT: 65 IN

## 2023-01-04 DIAGNOSIS — E11.9 CONTROLLED TYPE 2 DIABETES MELLITUS WITHOUT COMPLICATION, WITHOUT LONG-TERM CURRENT USE OF INSULIN (HCC): ICD-10-CM

## 2023-01-04 DIAGNOSIS — I25.10 CORONARY ARTERY DISEASE INVOLVING NATIVE CORONARY ARTERY OF NATIVE HEART WITHOUT ANGINA PECTORIS: ICD-10-CM

## 2023-01-04 DIAGNOSIS — Z87.891 HISTORY OF SMOKING 30 OR MORE PACK YEARS: ICD-10-CM

## 2023-01-04 DIAGNOSIS — E11.69 HYPERLIPIDEMIA ASSOCIATED WITH TYPE 2 DIABETES MELLITUS (HCC): ICD-10-CM

## 2023-01-04 DIAGNOSIS — Z00.00 MEDICARE ANNUAL WELLNESS VISIT, SUBSEQUENT: Primary | ICD-10-CM

## 2023-01-04 DIAGNOSIS — F32.A MILD DEPRESSION: ICD-10-CM

## 2023-01-04 DIAGNOSIS — Z12.5 PROSTATE CANCER SCREENING: ICD-10-CM

## 2023-01-04 DIAGNOSIS — E78.5 HYPERLIPIDEMIA ASSOCIATED WITH TYPE 2 DIABETES MELLITUS (HCC): ICD-10-CM

## 2023-01-04 DIAGNOSIS — Z12.11 COLON CANCER SCREENING: ICD-10-CM

## 2023-01-04 DIAGNOSIS — E11.9 DIABETIC EYE EXAM (HCC): ICD-10-CM

## 2023-01-04 DIAGNOSIS — Z01.00 DIABETIC EYE EXAM (HCC): ICD-10-CM

## 2023-01-04 PROCEDURE — 1126F AMNT PAIN NOTED NONE PRSNT: CPT | Performed by: INTERNAL MEDICINE

## 2023-01-04 PROCEDURE — 3078F DIAST BP <80 MM HG: CPT | Performed by: INTERNAL MEDICINE

## 2023-01-04 PROCEDURE — 90662 IIV NO PRSV INCREASED AG IM: CPT | Performed by: INTERNAL MEDICINE

## 2023-01-04 PROCEDURE — 99397 PER PM REEVAL EST PAT 65+ YR: CPT | Performed by: INTERNAL MEDICINE

## 2023-01-04 PROCEDURE — 96160 PT-FOCUSED HLTH RISK ASSMT: CPT | Performed by: INTERNAL MEDICINE

## 2023-01-04 PROCEDURE — G0008 ADMIN INFLUENZA VIRUS VAC: HCPCS | Performed by: INTERNAL MEDICINE

## 2023-01-04 PROCEDURE — 3008F BODY MASS INDEX DOCD: CPT | Performed by: INTERNAL MEDICINE

## 2023-01-04 PROCEDURE — 3074F SYST BP LT 130 MM HG: CPT | Performed by: INTERNAL MEDICINE

## 2023-01-04 PROCEDURE — G0438 PPPS, INITIAL VISIT: HCPCS | Performed by: INTERNAL MEDICINE

## 2023-01-04 RX ORDER — ESCITALOPRAM OXALATE 5 MG/1
5 TABLET ORAL DAILY
Qty: 30 TABLET | Refills: 1 | Status: SHIPPED | OUTPATIENT
Start: 2023-01-04

## 2023-01-04 RX ORDER — ATORVASTATIN CALCIUM 80 MG/1
80 TABLET, FILM COATED ORAL NIGHTLY
Qty: 90 TABLET | Refills: 1 | Status: SHIPPED | OUTPATIENT
Start: 2023-01-04

## 2023-01-05 ENCOUNTER — LAB ENCOUNTER (OUTPATIENT)
Dept: LAB | Age: 68
End: 2023-01-05
Attending: INTERNAL MEDICINE
Payer: MEDICARE

## 2023-01-05 DIAGNOSIS — E11.69 HYPERLIPIDEMIA ASSOCIATED WITH TYPE 2 DIABETES MELLITUS (HCC): ICD-10-CM

## 2023-01-05 DIAGNOSIS — Z00.00 MEDICARE ANNUAL WELLNESS VISIT, SUBSEQUENT: ICD-10-CM

## 2023-01-05 DIAGNOSIS — E11.9 CONTROLLED TYPE 2 DIABETES MELLITUS WITHOUT COMPLICATION, WITHOUT LONG-TERM CURRENT USE OF INSULIN (HCC): ICD-10-CM

## 2023-01-05 DIAGNOSIS — E78.5 HYPERLIPIDEMIA ASSOCIATED WITH TYPE 2 DIABETES MELLITUS (HCC): ICD-10-CM

## 2023-01-05 DIAGNOSIS — Z12.5 PROSTATE CANCER SCREENING: ICD-10-CM

## 2023-01-05 LAB
ALBUMIN SERPL-MCNC: 3.7 G/DL (ref 3.4–5)
ALBUMIN/GLOB SERPL: 1.2 {RATIO} (ref 1–2)
ALP LIVER SERPL-CCNC: 83 U/L
ALT SERPL-CCNC: 39 U/L
ANION GAP SERPL CALC-SCNC: 3 MMOL/L (ref 0–18)
AST SERPL-CCNC: 22 U/L (ref 15–37)
BASOPHILS # BLD AUTO: 0.03 X10(3) UL (ref 0–0.2)
BASOPHILS NFR BLD AUTO: 0.7 %
BILIRUB SERPL-MCNC: 1.2 MG/DL (ref 0.1–2)
BUN BLD-MCNC: 16 MG/DL (ref 7–18)
BUN/CREAT SERPL: 23.9 (ref 10–20)
CALCIUM BLD-MCNC: 8.9 MG/DL (ref 8.5–10.1)
CHLORIDE SERPL-SCNC: 108 MMOL/L (ref 98–112)
CHOLEST SERPL-MCNC: 148 MG/DL (ref ?–200)
CO2 SERPL-SCNC: 30 MMOL/L (ref 21–32)
COMPLEXED PSA SERPL-MCNC: 2.16 NG/ML (ref ?–4)
CREAT BLD-MCNC: 0.67 MG/DL
CREAT UR-SCNC: 46 MG/DL
DEPRECATED RDW RBC AUTO: 43.2 FL (ref 35.1–46.3)
EOSINOPHIL # BLD AUTO: 0.11 X10(3) UL (ref 0–0.7)
EOSINOPHIL NFR BLD AUTO: 2.7 %
ERYTHROCYTE [DISTWIDTH] IN BLOOD BY AUTOMATED COUNT: 12.5 % (ref 11–15)
EST. AVERAGE GLUCOSE BLD GHB EST-MCNC: 117 MG/DL (ref 68–126)
FASTING PATIENT LIPID ANSWER: YES
FASTING STATUS PATIENT QL REPORTED: YES
GFR SERPLBLD BASED ON 1.73 SQ M-ARVRAT: 103 ML/MIN/1.73M2 (ref 60–?)
GLOBULIN PLAS-MCNC: 3.2 G/DL (ref 2.8–4.4)
GLUCOSE BLD-MCNC: 114 MG/DL (ref 70–99)
HBA1C MFR BLD: 5.7 % (ref ?–5.7)
HCT VFR BLD AUTO: 42.5 %
HDLC SERPL-MCNC: 41 MG/DL (ref 40–59)
HGB BLD-MCNC: 14.3 G/DL
IMM GRANULOCYTES # BLD AUTO: 0.02 X10(3) UL (ref 0–1)
IMM GRANULOCYTES NFR BLD: 0.5 %
LDLC SERPL CALC-MCNC: 83 MG/DL (ref ?–100)
LYMPHOCYTES # BLD AUTO: 0.65 X10(3) UL (ref 1–4)
LYMPHOCYTES NFR BLD AUTO: 15.9 %
MCH RBC QN AUTO: 31.8 PG (ref 26–34)
MCHC RBC AUTO-ENTMCNC: 33.6 G/DL (ref 31–37)
MCV RBC AUTO: 94.4 FL
MICROALBUMIN UR-MCNC: <0.5 MG/DL
MONOCYTES # BLD AUTO: 0.42 X10(3) UL (ref 0.1–1)
MONOCYTES NFR BLD AUTO: 10.3 %
NEUTROPHILS # BLD AUTO: 2.86 X10 (3) UL (ref 1.5–7.7)
NEUTROPHILS # BLD AUTO: 2.86 X10(3) UL (ref 1.5–7.7)
NEUTROPHILS NFR BLD AUTO: 69.9 %
NONHDLC SERPL-MCNC: 107 MG/DL (ref ?–130)
OSMOLALITY SERPL CALC.SUM OF ELEC: 294 MOSM/KG (ref 275–295)
PLATELET # BLD AUTO: 191 10(3)UL (ref 150–450)
POTASSIUM SERPL-SCNC: 4.1 MMOL/L (ref 3.5–5.1)
PROT SERPL-MCNC: 6.9 G/DL (ref 6.4–8.2)
RBC # BLD AUTO: 4.5 X10(6)UL
SODIUM SERPL-SCNC: 141 MMOL/L (ref 136–145)
TRIGL SERPL-MCNC: 135 MG/DL (ref 30–149)
VLDLC SERPL CALC-MCNC: 21 MG/DL (ref 0–30)
WBC # BLD AUTO: 4.1 X10(3) UL (ref 4–11)

## 2023-01-05 PROCEDURE — 82570 ASSAY OF URINE CREATININE: CPT

## 2023-01-05 PROCEDURE — 36415 COLL VENOUS BLD VENIPUNCTURE: CPT

## 2023-01-05 PROCEDURE — 85025 COMPLETE CBC W/AUTO DIFF WBC: CPT

## 2023-01-05 PROCEDURE — 82043 UR ALBUMIN QUANTITATIVE: CPT

## 2023-01-05 PROCEDURE — 83036 HEMOGLOBIN GLYCOSYLATED A1C: CPT

## 2023-01-05 PROCEDURE — 80061 LIPID PANEL: CPT

## 2023-01-05 PROCEDURE — 80053 COMPREHEN METABOLIC PANEL: CPT

## 2023-04-03 RX ORDER — ESCITALOPRAM OXALATE 5 MG/1
TABLET ORAL
Qty: 30 TABLET | Refills: 0 | Status: SHIPPED | OUTPATIENT
Start: 2023-04-03

## 2023-04-03 RX ORDER — EZETIMIBE 10 MG/1
TABLET ORAL
Qty: 90 TABLET | Refills: 0 | Status: SHIPPED | OUTPATIENT
Start: 2023-04-03

## 2023-05-24 ENCOUNTER — TELEPHONE (OUTPATIENT)
Dept: INTERNAL MEDICINE CLINIC | Facility: CLINIC | Age: 68
End: 2023-05-24

## 2023-05-24 NOTE — TELEPHONE ENCOUNTER
Referral request for Leqvio infusion received from [facility], placed on Dr Irby desshelley for review and signature.

## 2023-06-01 ENCOUNTER — TELEPHONE (OUTPATIENT)
Dept: CASE MANAGEMENT | Age: 68
End: 2023-06-01

## 2023-06-01 DIAGNOSIS — I25.10 ASCVD (ARTERIOSCLEROTIC CARDIOVASCULAR DISEASE): Primary | ICD-10-CM

## 2023-06-01 NOTE — TELEPHONE ENCOUNTER
Dr. Precious Cadena,    Dr. Martha Plunkett office is requesting a referral for 3 visits a year for injections with [facility]. Pended referral please review diagnosis and sign off if you agree. Thank you.   Jorge Mc

## 2023-06-01 NOTE — TELEPHONE ENCOUNTER
[id number] [facility] at [phone], left message for Rachel to call back to inform that an order request for Nina Brennan was received but this medication was not ordered by Dr Malathi Ortez, it was ordered by 's cardiologist Dr Janell Tian.

## 2023-06-08 ENCOUNTER — LAB ENCOUNTER (OUTPATIENT)
Dept: LAB | Age: 68
End: 2023-06-08
Attending: INTERNAL MEDICINE
Payer: MEDICARE

## 2023-06-08 DIAGNOSIS — E78.5 HYPERLIPIDEMIA, UNSPECIFIED HYPERLIPIDEMIA TYPE: ICD-10-CM

## 2023-06-08 DIAGNOSIS — E78.5 DYSLIPIDEMIA ASSOCIATED WITH TYPE 2 DIABETES MELLITUS (HCC): ICD-10-CM

## 2023-06-08 DIAGNOSIS — E11.69 DYSLIPIDEMIA ASSOCIATED WITH TYPE 2 DIABETES MELLITUS (HCC): ICD-10-CM

## 2023-06-08 LAB
ALBUMIN SERPL-MCNC: 3.7 G/DL (ref 3.4–5)
ALBUMIN/GLOB SERPL: 1.3 {RATIO} (ref 1–2)
ALP LIVER SERPL-CCNC: 61 U/L
ALT SERPL-CCNC: 51 U/L
ANION GAP SERPL CALC-SCNC: 4 MMOL/L (ref 0–18)
AST SERPL-CCNC: 38 U/L (ref 15–37)
BILIRUB SERPL-MCNC: 0.6 MG/DL (ref 0.1–2)
BUN BLD-MCNC: 14 MG/DL (ref 7–18)
BUN/CREAT SERPL: 22.6 (ref 10–20)
CALCIUM BLD-MCNC: 8.9 MG/DL (ref 8.5–10.1)
CHLORIDE SERPL-SCNC: 110 MMOL/L (ref 98–112)
CHOLEST SERPL-MCNC: 98 MG/DL (ref ?–200)
CO2 SERPL-SCNC: 28 MMOL/L (ref 21–32)
CREAT BLD-MCNC: 0.62 MG/DL
FASTING PATIENT LIPID ANSWER: YES
FASTING STATUS PATIENT QL REPORTED: YES
GFR SERPLBLD BASED ON 1.73 SQ M-ARVRAT: 105 ML/MIN/1.73M2 (ref 60–?)
GLOBULIN PLAS-MCNC: 2.9 G/DL (ref 2.8–4.4)
GLUCOSE BLD-MCNC: 103 MG/DL (ref 70–99)
HDLC SERPL-MCNC: 41 MG/DL (ref 40–59)
LDLC SERPL CALC-MCNC: 43 MG/DL (ref ?–100)
NONHDLC SERPL-MCNC: 57 MG/DL (ref ?–130)
OSMOLALITY SERPL CALC.SUM OF ELEC: 295 MOSM/KG (ref 275–295)
POTASSIUM SERPL-SCNC: 5 MMOL/L (ref 3.5–5.1)
PROT SERPL-MCNC: 6.6 G/DL (ref 6.4–8.2)
SODIUM SERPL-SCNC: 142 MMOL/L (ref 136–145)
TRIGL SERPL-MCNC: 65 MG/DL (ref 30–149)
VLDLC SERPL CALC-MCNC: 9 MG/DL (ref 0–30)

## 2023-06-08 PROCEDURE — 36415 COLL VENOUS BLD VENIPUNCTURE: CPT

## 2023-06-08 PROCEDURE — 80053 COMPREHEN METABOLIC PANEL: CPT

## 2023-06-08 PROCEDURE — 80061 LIPID PANEL: CPT

## 2023-06-11 ENCOUNTER — TELEPHONE (OUTPATIENT)
Dept: INTERNAL MEDICINE CLINIC | Facility: CLINIC | Age: 68
End: 2023-06-11

## 2023-06-11 DIAGNOSIS — R79.89 ELEVATED LFTS: Primary | ICD-10-CM

## 2023-09-01 RX ORDER — ATORVASTATIN CALCIUM 80 MG/1
80 TABLET, FILM COATED ORAL NIGHTLY
Qty: 90 TABLET | Refills: 1 | Status: SHIPPED | OUTPATIENT
Start: 2023-09-01

## 2023-10-11 ENCOUNTER — MED REC SCAN ONLY (OUTPATIENT)
Dept: INTERNAL MEDICINE CLINIC | Facility: CLINIC | Age: 68
End: 2023-10-11

## 2023-12-06 RX ORDER — EZETIMIBE 10 MG/1
10 TABLET ORAL NIGHTLY
Qty: 90 TABLET | Refills: 3 | Status: SHIPPED | OUTPATIENT
Start: 2023-12-06

## 2023-12-06 NOTE — TELEPHONE ENCOUNTER
Refill passed per [facility] protocol.     Requested Prescriptions   Pending Prescriptions Disp Refills    EZETIMIBE 10 MG Oral Tab [Pharmacy Med Name: Ezetimibe 10 MG Oral Tablet] 90 tablet 0     Sig: Take 1 tablet by mouth nightly       Cholesterol Medication Protocol Passed - 12/6/2023  7:50 AM        Passed - ALT in past 12 months        Passed - LDL in past 12 months        Passed - Last ALT < 80     Lab Results   Component Value Date    ALT 51 06/08/2023             Passed - Last LDL < 130     Lab Results   Component Value Date    LDL 43 06/08/2023             Passed - In person appointment or virtual visit in the past 12 mos or appointment in next 3 mos     Recent Outpatient Visits              11 months ago Medicare annual wellness visit, subsequent    [facility] Frank Chen MD    Office Visit    1 year ago Medicare annual wellness visit, initial    [facility] Frank Chen MD    Office Visit    3 [facility]    [facility] Visit    4 years ago Annual physical exam    [facility] Frank Chen MD    Office Visit    4 years ago [facility]    [facility] Visit                           [unfilled]      @[facility]@

## 2024-01-23 ENCOUNTER — OFFICE VISIT (OUTPATIENT)
Dept: INTERNAL MEDICINE CLINIC | Facility: CLINIC | Age: 69
End: 2024-01-23
Payer: MEDICARE

## 2024-01-23 ENCOUNTER — LAB ENCOUNTER (OUTPATIENT)
Dept: LAB | Age: 69
End: 2024-01-23
Attending: INTERNAL MEDICINE
Payer: MEDICARE

## 2024-01-23 VITALS
HEIGHT: 65 IN | BODY MASS INDEX: 31.88 KG/M2 | DIASTOLIC BLOOD PRESSURE: 68 MMHG | SYSTOLIC BLOOD PRESSURE: 110 MMHG | OXYGEN SATURATION: 96 % | TEMPERATURE: 97 F | HEART RATE: 52 BPM | WEIGHT: 191.31 LBS

## 2024-01-23 DIAGNOSIS — I25.10 CORONARY ARTERY DISEASE INVOLVING NATIVE CORONARY ARTERY OF NATIVE HEART WITHOUT ANGINA PECTORIS: ICD-10-CM

## 2024-01-23 DIAGNOSIS — I83.811 VARICOSE VEINS OF LEG WITH PAIN, RIGHT: ICD-10-CM

## 2024-01-23 DIAGNOSIS — Z01.00 DIABETIC EYE EXAM (HCC): ICD-10-CM

## 2024-01-23 DIAGNOSIS — E11.69 HYPERLIPIDEMIA ASSOCIATED WITH TYPE 2 DIABETES MELLITUS  (HCC): ICD-10-CM

## 2024-01-23 DIAGNOSIS — Z12.5 PROSTATE CANCER SCREENING: ICD-10-CM

## 2024-01-23 DIAGNOSIS — F32.A MILD DEPRESSION: ICD-10-CM

## 2024-01-23 DIAGNOSIS — E11.9 DIABETIC EYE EXAM (HCC): ICD-10-CM

## 2024-01-23 DIAGNOSIS — E78.5 HYPERLIPIDEMIA ASSOCIATED WITH TYPE 2 DIABETES MELLITUS  (HCC): ICD-10-CM

## 2024-01-23 DIAGNOSIS — Z12.11 COLON CANCER SCREENING: ICD-10-CM

## 2024-01-23 DIAGNOSIS — Z00.00 MEDICARE ANNUAL WELLNESS VISIT, SUBSEQUENT: ICD-10-CM

## 2024-01-23 DIAGNOSIS — E11.9 CONTROLLED TYPE 2 DIABETES MELLITUS WITHOUT COMPLICATION, WITHOUT LONG-TERM CURRENT USE OF INSULIN (HCC): ICD-10-CM

## 2024-01-23 DIAGNOSIS — Z53.20 LUNG CANCER SCREENING DECLINED BY PATIENT: ICD-10-CM

## 2024-01-23 DIAGNOSIS — Z00.00 MEDICARE ANNUAL WELLNESS VISIT, SUBSEQUENT: Primary | ICD-10-CM

## 2024-01-23 DIAGNOSIS — R79.89 ELEVATED LFTS: ICD-10-CM

## 2024-01-23 LAB
ALBUMIN SERPL-MCNC: 4.3 G/DL (ref 3.2–4.8)
ALBUMIN/GLOB SERPL: 1.7 {RATIO} (ref 1–2)
ALP LIVER SERPL-CCNC: 73 U/L
ALT SERPL-CCNC: 45 U/L
ANION GAP SERPL CALC-SCNC: 6 MMOL/L (ref 0–18)
AST SERPL-CCNC: 35 U/L (ref ?–34)
BASOPHILS # BLD AUTO: 0.03 X10(3) UL (ref 0–0.2)
BASOPHILS NFR BLD AUTO: 0.7 %
BILIRUB DIRECT SERPL-MCNC: 0.3 MG/DL (ref ?–0.3)
BILIRUB SERPL-MCNC: 0.7 MG/DL (ref 0.2–1.1)
BUN BLD-MCNC: 12 MG/DL (ref 9–23)
BUN/CREAT SERPL: 16.7 (ref 10–20)
CALCIUM BLD-MCNC: 9.1 MG/DL (ref 8.7–10.4)
CHLORIDE SERPL-SCNC: 106 MMOL/L (ref 98–112)
CHOLEST SERPL-MCNC: 120 MG/DL (ref ?–200)
CO2 SERPL-SCNC: 29 MMOL/L (ref 21–32)
COMPLEXED PSA SERPL-MCNC: 1.99 NG/ML (ref ?–4)
CREAT BLD-MCNC: 0.72 MG/DL
CREAT UR-SCNC: 128 MG/DL
DEPRECATED RDW RBC AUTO: 43.3 FL (ref 35.1–46.3)
EGFRCR SERPLBLD CKD-EPI 2021: 100 ML/MIN/1.73M2 (ref 60–?)
EOSINOPHIL # BLD AUTO: 0.13 X10(3) UL (ref 0–0.7)
EOSINOPHIL NFR BLD AUTO: 3 %
ERYTHROCYTE [DISTWIDTH] IN BLOOD BY AUTOMATED COUNT: 12.7 % (ref 11–15)
EST. AVERAGE GLUCOSE BLD GHB EST-MCNC: 117 MG/DL (ref 68–126)
FASTING PATIENT LIPID ANSWER: YES
FASTING STATUS PATIENT QL REPORTED: YES
GLOBULIN PLAS-MCNC: 2.5 G/DL (ref 2.8–4.4)
GLUCOSE BLD-MCNC: 115 MG/DL (ref 70–99)
HBA1C MFR BLD: 5.7 % (ref ?–5.7)
HCT VFR BLD AUTO: 42.4 %
HDLC SERPL-MCNC: 39 MG/DL (ref 40–59)
HGB BLD-MCNC: 14.1 G/DL
IMM GRANULOCYTES # BLD AUTO: 0.02 X10(3) UL (ref 0–1)
IMM GRANULOCYTES NFR BLD: 0.5 %
LDLC SERPL CALC-MCNC: 60 MG/DL (ref ?–100)
LYMPHOCYTES # BLD AUTO: 1.3 X10(3) UL (ref 1–4)
LYMPHOCYTES NFR BLD AUTO: 30 %
MCH RBC QN AUTO: 31 PG (ref 26–34)
MCHC RBC AUTO-ENTMCNC: 33.3 G/DL (ref 31–37)
MCV RBC AUTO: 93.2 FL
MICROALBUMIN UR-MCNC: 0.6 MG/DL
MICROALBUMIN/CREAT 24H UR-RTO: 4.7 UG/MG (ref ?–30)
MONOCYTES # BLD AUTO: 0.39 X10(3) UL (ref 0.1–1)
MONOCYTES NFR BLD AUTO: 9 %
NEUTROPHILS # BLD AUTO: 2.47 X10 (3) UL (ref 1.5–7.7)
NEUTROPHILS # BLD AUTO: 2.47 X10(3) UL (ref 1.5–7.7)
NEUTROPHILS NFR BLD AUTO: 56.8 %
NONHDLC SERPL-MCNC: 81 MG/DL (ref ?–130)
OSMOLALITY SERPL CALC.SUM OF ELEC: 293 MOSM/KG (ref 275–295)
PLATELET # BLD AUTO: 222 10(3)UL (ref 150–450)
POTASSIUM SERPL-SCNC: 4.7 MMOL/L (ref 3.5–5.1)
PROT SERPL-MCNC: 6.8 G/DL (ref 5.7–8.2)
RBC # BLD AUTO: 4.55 X10(6)UL
SODIUM SERPL-SCNC: 141 MMOL/L (ref 136–145)
TRIGL SERPL-MCNC: 117 MG/DL (ref 30–149)
VLDLC SERPL CALC-MCNC: 17 MG/DL (ref 0–30)
WBC # BLD AUTO: 4.3 X10(3) UL (ref 4–11)

## 2024-01-23 PROCEDURE — 82570 ASSAY OF URINE CREATININE: CPT

## 2024-01-23 PROCEDURE — 1170F FXNL STATUS ASSESSED: CPT | Performed by: INTERNAL MEDICINE

## 2024-01-23 PROCEDURE — 1126F AMNT PAIN NOTED NONE PRSNT: CPT | Performed by: INTERNAL MEDICINE

## 2024-01-23 PROCEDURE — 82043 UR ALBUMIN QUANTITATIVE: CPT

## 2024-01-23 PROCEDURE — 96160 PT-FOCUSED HLTH RISK ASSMT: CPT | Performed by: INTERNAL MEDICINE

## 2024-01-23 PROCEDURE — 80053 COMPREHEN METABOLIC PANEL: CPT

## 2024-01-23 PROCEDURE — 3078F DIAST BP <80 MM HG: CPT | Performed by: INTERNAL MEDICINE

## 2024-01-23 PROCEDURE — 99499 UNLISTED E&M SERVICE: CPT | Performed by: INTERNAL MEDICINE

## 2024-01-23 PROCEDURE — 85025 COMPLETE CBC W/AUTO DIFF WBC: CPT

## 2024-01-23 PROCEDURE — 3061F NEG MICROALBUMINURIA REV: CPT | Performed by: INTERNAL MEDICINE

## 2024-01-23 PROCEDURE — 83036 HEMOGLOBIN GLYCOSYLATED A1C: CPT

## 2024-01-23 PROCEDURE — 1160F RVW MEDS BY RX/DR IN RCRD: CPT | Performed by: INTERNAL MEDICINE

## 2024-01-23 PROCEDURE — 36415 COLL VENOUS BLD VENIPUNCTURE: CPT

## 2024-01-23 PROCEDURE — 90662 IIV NO PRSV INCREASED AG IM: CPT | Performed by: INTERNAL MEDICINE

## 2024-01-23 PROCEDURE — 3044F HG A1C LEVEL LT 7.0%: CPT | Performed by: INTERNAL MEDICINE

## 2024-01-23 PROCEDURE — 3008F BODY MASS INDEX DOCD: CPT | Performed by: INTERNAL MEDICINE

## 2024-01-23 PROCEDURE — 1159F MED LIST DOCD IN RCRD: CPT | Performed by: INTERNAL MEDICINE

## 2024-01-23 PROCEDURE — G0008 ADMIN INFLUENZA VIRUS VAC: HCPCS | Performed by: INTERNAL MEDICINE

## 2024-01-23 PROCEDURE — 90677 PCV20 VACCINE IM: CPT | Performed by: INTERNAL MEDICINE

## 2024-01-23 PROCEDURE — 3074F SYST BP LT 130 MM HG: CPT | Performed by: INTERNAL MEDICINE

## 2024-01-23 PROCEDURE — 82248 BILIRUBIN DIRECT: CPT

## 2024-01-23 PROCEDURE — 80061 LIPID PANEL: CPT

## 2024-01-23 PROCEDURE — G0439 PPPS, SUBSEQ VISIT: HCPCS | Performed by: INTERNAL MEDICINE

## 2024-01-23 PROCEDURE — G0009 ADMIN PNEUMOCOCCAL VACCINE: HCPCS | Performed by: INTERNAL MEDICINE

## 2024-01-23 RX ORDER — ESCITALOPRAM OXALATE 10 MG/1
10 TABLET ORAL DAILY
Qty: 90 TABLET | Refills: 1 | Status: SHIPPED | OUTPATIENT
Start: 2024-01-23

## 2024-01-23 NOTE — PROGRESS NOTES
Subjective:   Tameka Fisher is a 68 year old male who presents for a Medicare Subsequent Annual Wellness visit (Pt already had Initial Annual Wellness) and scheduled follow up of multiple significant but stable problems.       History/Other:   Fall Risk Assessment:   He has been screened for Falls and is low risk.      Cognitive Assessment:   He had a completely normal cognitive assessment - see flowsheet entries     Functional Ability/Status:   Tameka Fisher has some abnormal functions as listed below:  He has difficulties Affording Meds based on screening of functional status.       Depression Screening (PHQ-2/PHQ-9): PHQ-2 SCORE: 0  , done 1/23/2024   If you checked off any problems, how difficult have these problems made it for you to do your work, take care of things at home, or get along with other people?: Not difficult at all         5 minutes spent screening and counseling for depression    Advanced Directives:   He does NOT have a Living Will. [Do you have a living will?: No]  He does NOT have a Power of  for Health Care. [Do you have a healthcare power of ?: No]  Discussed Advance Care Planning with patient (and family/surrogate if present). Standard forms made available to patient in After Visit Summary.      Patient Active Problem List   Diagnosis    Coronary artery disease involving native coronary artery of native heart without angina pectoris    Controlled type 2 diabetes mellitus without complication, without long-term current use of insulin (HCC)    Prostate cancer screening    Need for vaccination    Varicose veins of leg with pain, right    Medicare annual wellness visit, subsequent    Diabetic eye exam (HCC)    Hyperlipidemia associated with type 2 diabetes mellitus  (HCC)    Colon cancer screening    Lung cancer screening declined by patient    Mild depression     Allergies:  He has No Known Allergies.    Current Medications:  Outpatient Medications Marked as Taking for  the 1/23/24 encounter (Office Visit) with Frank Chen MD   Medication Sig    escitalopram 10 MG Oral Tab Take 1 tablet (10 mg total) by mouth daily.    ezetimibe 10 MG Oral Tab Take 1 tablet (10 mg total) by mouth nightly.    atorvastatin 80 MG Oral Tab Take 1 tablet (80 mg total) by mouth nightly.    omega-3 fatty acids 1000 MG Oral Cap Take 1,000 mg by mouth daily.    Bisoprolol Fumarate (ZEBETA) 5 MG Oral Tab Take 1 tablet (5 mg total) by mouth daily.    docusate sodium 100 MG Oral Cap Take 2 capsules (200 mg total) by mouth daily.    aspirin 81 MG Oral Tab Take 1 tablet (81 mg total) by mouth daily.       Medical History:  He  has a past medical history of Atherosclerosis of coronary artery, Coronary artery disease, Esophageal reflux (2011), Mild depression (1/24/2024), Other and unspecified hyperlipidemia, and Prediabetes.  Surgical History:  He  has a past surgical history that includes cabg (2011) and colonoscopy.   Family History:  His family history includes Cancer in his sister; Heart Disease in his father; No Known Problems in his brother and mother.  Social History:  He  reports that he has quit smoking. His smoking use included cigarettes. He has a 30 pack-year smoking history. He has been exposed to tobacco smoke. He has never used smokeless tobacco. He reports current alcohol use. He reports that he does not use drugs.    Tobacco:  He smoked tobacco in the past but quit greater than 12 months ago.  Social History    Tobacco Use      Smoking status: Former        Packs/day: 1.00        Years: 30.00        Additional pack years: 0.00        Total pack years: 30.00        Types: Cigarettes        Passive exposure: Past      Smokeless tobacco: Never      Tobacco comments: quit 13 yrs ago        CAGE Alcohol Screen:   CAGE screening score of 0 on 1/23/2024, showing low risk of alcohol abuse.      Patient Care Team:  Frank Chen MD as PCP - General (Internal Medicine)    Review of  Systems  GENERAL: feels well otherwise  SKIN: denies any unusual skin lesions  EYES: denies blurred vision or double vision  HEENT: denies nasal congestion, sinus pain or ST  LUNGS: denies shortness of breath with exertion  CARDIOVASCULAR: denies chest pain on exertion  GI: denies abdominal pain, denies heartburn  : 0 per night nocturia, no complaint of urinary incontinence  MUSCULOSKELETAL: denies back pain  NEURO: denies headaches  PSYCHE:  depression or anxiety  HEMATOLOGIC: denies hx of anemia  ENDOCRINE: denies thyroid history  ALL/ASTHMA: denies hx of allergy or asthma    Objective:   Physical Exam  General Appearance:  Alert, cooperative, no distress, appears stated age   Head:  Normocephalic, without obvious abnormality, atraumatic   Eyes:  PERRL, conjunctiva/corneas clear, EOM's intact, both eyes   Ears:  Normal TM's and external ear canals, both ears   Nose: Nares normal, septum midline, mucosa normal, no drainage or sinus tenderness   Throat: Lips, mucosa, and tongue normal; teeth and gums normal   Neck: Supple, symmetrical, trachea midline, no adenopathy, thyroid: not enlarged, symmetric, no tenderness/mass/nodules, no carotid bruit or JVD   Back:   Symmetric, no curvature, ROM normal, no CVA tenderness   Lungs:   Clear to auscultation bilaterally, respirations unlabored   Chest Wall:  No tenderness or deformity   Heart:  Regular rate and rhythm, S1, S2 normal, no murmur, rub or gallop   Abdomen:   Soft, non-tender, bowel sounds active all four quadrants,  no masses, no organomegaly   Genitalia: Normal male   Rectal: Normal tone, symmetrical mildly enlarged   prostate without nodules, no masses or tenderness   Extremities: Extremities normal, atraumatic, no cyanosis or edema; monofilament testing normal both feet   Pulses: 2+ and symmetric   Skin: Skin color, texture, turgor normal, no rashes or lesions   Lymph nodes: Cervical, supraclavicular, nodes normal   Neurologic: Normal     /68   Pulse  52   Temp 97.4 °F (36.3 °C) (Tympanic)   Ht 5' 5\" (1.651 m)   Wt 191 lb 4.8 oz (86.8 kg)   SpO2 96%   BMI 31.83 kg/m²  Estimated body mass index is 31.83 kg/m² as calculated from the following:    Height as of this encounter: 5' 5\" (1.651 m).    Weight as of this encounter: 191 lb 4.8 oz (86.8 kg).    Medicare Hearing Assessment:   Hearing Screening    Time taken: 1/23/2024  8:52 AM  Screening Method: Questionnaire  I have a problem hearing over the telephone: No I have trouble following the conversations when two or more people are talking at the same time: No   I have trouble understanding things on the TV: No I have to strain to understand conversations: No   I have to worry about missing the telephone ring or doorbell: No I have trouble hearing conversations in a noisy background such as a crowded room or restaurant: No   I get confused about where sounds come from: No I misunderstand some words in a sentence and need to ask people to repeat themselves: No   I especially have trouble understanding the speech of women and children: No I have trouble understanding the speaker in a large room such as at a meeting or place of [employer]: No   Many people I talk to seem to mumble (or don't speak clearly): No People get annoyed because I misunderstand what they say: No   I misunderstand what others are saying and make inappropriate responses: No I avoid social activities because I cannot hear well and fear I will reply improperly: No   Family members and friends have told me they think I may have hearing loss: No             Visual Acuity:   Right Eye Visual Acuity: Corrected Right Eye Chart Acuity: 20/30   Left Eye Visual Acuity: Corrected Left Eye Chart Acuity: 20/70   Both Eyes Visual Acuity: Corrected Both Eyes Chart Acuity: 20/40   Able To Tolerate Visual Acuity: Yes        Assessment & Plan:   Tameka Fisher is a 68 year old male who presents for a Medicare Assessment.     (Z00.00) Medicare annual wellness  visit, subsequent  (primary encounter diagnosis)  Plan: CBC With Differential With Platelet        Routine labs ordered. Pt given flu shot and prevnar 20. Advised to get RSV vaccine. And latest covid booster.     (E11.9) Controlled type 2 diabetes mellitus without complication, without long-term current use of insulin (HCC)  Plan: Hemoglobin A1C, Microalb/Creat Ratio, Random         Urine        Check A1c; had been controlled well before with diet alone.     (Z01.00,  E11.9) Diabetic eye exam (HCC)  Plan: Ophthalmology Referral - In Network        Referred to optha.     (I25.10) Coronary artery disease involving native coronary artery of native heart without angina pectoris  Plan: Cardio Referral - Internal        Pt had abnormal stress test last year, and was advised by cardio to have LHC but pt and family still has not made decision yet, explained to pt/spouse reason and importance of doing LHC. Pt has ffup with cardio next month and they will discuss with cardio by then. He is asymptomatic; continue with antiplatlet tx and statin and beta blocker.    (E11.69,  E78.5) Hyperlipidemia associated with type 2 diabetes mellitus  (HCC)  Plan: Comp Metabolic Panel (14), Lipid Panel        Check lipid panel; cotninue with atorastatin and zetia.     (Z12.5) Prostate cancer screening  Plan: PSA Total, Screen        Check psa.     (Z12.11) Colon cancer screening  Plan: pt due for his colonoscopy but with his cardiac issue not resolved yet, will need to wait til this is addressed.     (Z53.20) Lung cancer screening declined by patient  Plan: pt declined.     (F32.A) Mild depression  Plan: pt now working part time and per spouse, when pt at home when not at work has become lonely; no social activities per wife. Denies suicidal thoughts. Advised to increase lexapro to 10mg daily;  call back if no improvement or worsens.     (I83.811) Varicose veins of leg with pain, right  Plan: pt already uses compression stockings; he already  seen cardio and DR Glover does treat.     The patient indicates understanding of these issues and agrees to the plan.  Reinforced healthy diet, lifestyle, and exercise.      No follow-ups on file.     Frank Chen MD, 1/23/2024     Supplementary Documentation:   General Health:  In the past six months, have you lost more than 10 pounds without trying?: 2 - No  Has your appetite been poor?: No  Type of Diet: Balanced  How does the patient maintain a good energy level?: Appropriate Exercise  How would you describe your daily physical activity?: Light  How would you describe your current health state?: Fair  How do you maintain positive mental well-being?: Social Interaction;Puzzles  On a scale of 0 to 10, with 0 being no pain and 10 being severe pain, what is your pain level?: 0 - (None)  In the past six months, have you experienced urine leakage?: 0-No  At any time do you feel concerned for the safety/well-being of yourself and/or your children, in your home or elsewhere?: No  Have you had any immunizations at another office such as Influenza, Hepatitis B, Tetanus, or Pneumococcal?: No        Tameka Fisher's SCREENING SCHEDULE   Tests on this list are recommended by your physician but may not be covered, or covered at this frequency, by your insurer.   Please check with your insurance carrier before scheduling to verify coverage.   PREVENTATIVE SERVICES FREQUENCY &  COVERAGE DETAILS LAST COMPLETION DATE   Diabetes Screening    Fasting Blood Sugar / Glucose    One screening every 12 months if never tested or if previously tested but not diagnosed with pre-diabetes   One screening every 6 months if diagnosed with pre-diabetes Lab Results   Component Value Date     (H) 01/23/2024        Cardiovascular Disease Screening    Lipid Panel  Cholesterol  Lipoprotein (HDL)  Triglycerides Covered every 5 years for all Medicare beneficiaries without apparent signs or symptoms of cardiovascular disease Lab  Results   Component Value Date    CHOLEST 120 01/23/2024    HDL 39 (L) 01/23/2024    LDL 60 01/23/2024    TRIG 117 01/23/2024         Electrocardiogram (EKG)   Covered if needed at Welcome to Medicare, and non-screening if indicated for medical reasons 08/27/2011      Ultrasound Screening for Abdominal Aortic Aneurysm (AAA) Covered once in a lifetime for one of the following risk factors    Men who are 65-75 years old and have ever smoked    Anyone with a family history 12/21/2022     Colorectal Cancer Screening  Covered for ages 50-85; only need ONE of the following:    Colonoscopy   Covered every 10 years    Covered every 2 years if patient is at high risk or previous colonoscopy was abnormal -    Health Maintenance   Topic Date Due    Colorectal Cancer Screening  01/13/2024       Flexible Sigmoidoscopy   Covered every 4 years -    Fecal Occult Blood Test Covered annually -   Prostate Cancer Screening    Prostate-Specific Antigen (PSA) Annually Lab Results   Component Value Date    PSA 2.42 01/27/2022     Health Maintenance   Topic Date Due    PSA  01/23/2026      Immunizations    Influenza Covered once per flu season  Please get every year 01/23/2024  No recommendations at this time    Pneumococcal Each vaccine ([id number] & [id number]) covered once after 65 Prevnar 13: -    [id number]: 12/23/2021     No recommendations at this time    Hepatitis B One screening covered for patients with certain risk factors   -  No recommendations at this time    Tetanus Toxoid Not covered by Medicare Part B unless medically necessary (cut with metal); may be covered with your pharmacy prescription benefits -    Tetanus, Diptheria and Pertusis TD and TDaP Not covered by Medicare Part B -  No recommendations at this time    Zoster Not covered by Medicare Part B; may be covered with your pharmacy  prescription benefits -  Zoster Vaccines(1 of 2) Never done     Diabetes      Hemoglobin A1C Annually; if last result is elevated, may  be asked to retest more frequently.    Medicare covers every 3 months Lab Results   Component Value Date     01/23/2024    A1C 5.7 (H) 01/23/2024       No recommendations at this time    Creat/alb ratio Annually Lab Results   Component Value Date    MICROALBCREA 4.7 01/23/2024       LDL Annually Lab Results   Component Value Date    LDL 60 01/23/2024       Dilated Eye Exam Annually Last Diabetic Eye Exam:  No data recorded  No data recorded

## 2024-01-24 PROBLEM — E78.5 HYPERLIPIDEMIA ASSOCIATED WITH TYPE 2 DIABETES MELLITUS  (HCC): Status: ACTIVE | Noted: 2024-01-24

## 2024-01-24 PROBLEM — D17.23 LIPOMA OF RIGHT THIGH: Status: RESOLVED | Noted: 2019-02-15 | Resolved: 2024-01-24

## 2024-01-24 PROBLEM — F32.A MILD DEPRESSION: Status: ACTIVE | Noted: 2024-01-24

## 2024-01-24 PROBLEM — Z01.00 DIABETIC EYE EXAM (HCC): Status: ACTIVE | Noted: 2024-01-24

## 2024-01-24 PROBLEM — Z00.00 MEDICARE ANNUAL WELLNESS VISIT, SUBSEQUENT: Status: ACTIVE | Noted: 2024-01-24

## 2024-01-24 PROBLEM — Z12.11 COLON CANCER SCREENING: Status: ACTIVE | Noted: 2024-01-24

## 2024-01-24 PROBLEM — E78.5 HYPERLIPIDEMIA ASSOCIATED WITH TYPE 2 DIABETES MELLITUS (HCC): Status: ACTIVE | Noted: 2024-01-24

## 2024-01-24 PROBLEM — E11.69 HYPERLIPIDEMIA ASSOCIATED WITH TYPE 2 DIABETES MELLITUS  (HCC): Status: ACTIVE | Noted: 2024-01-24

## 2024-01-24 PROBLEM — Z53.20 LUNG CANCER SCREENING DECLINED BY PATIENT: Status: ACTIVE | Noted: 2024-01-24

## 2024-01-24 PROBLEM — E11.9 DIABETIC EYE EXAM (HCC): Status: ACTIVE | Noted: 2024-01-24

## 2024-01-24 PROBLEM — E11.69 HYPERLIPIDEMIA ASSOCIATED WITH TYPE 2 DIABETES MELLITUS (HCC): Status: ACTIVE | Noted: 2024-01-24

## 2024-01-26 ENCOUNTER — TELEPHONE (OUTPATIENT)
Dept: INTERNAL MEDICINE CLINIC | Facility: CLINIC | Age: 69
End: 2024-01-26

## 2024-01-28 ENCOUNTER — TELEPHONE (OUTPATIENT)
Dept: INTERNAL MEDICINE CLINIC | Facility: CLINIC | Age: 69
End: 2024-01-28

## 2024-01-28 DIAGNOSIS — R79.89 ELEVATED LFTS: Primary | ICD-10-CM

## 2024-03-14 ENCOUNTER — TELEPHONE (OUTPATIENT)
Dept: INTERNAL MEDICINE CLINIC | Facility: CLINIC | Age: 69
End: 2024-03-14

## 2024-03-14 NOTE — TELEPHONE ENCOUNTER
----- Message from Emmy Dietz RN sent at 3/14/2024  2:34 PM CDT -----  Regarding: FW: request medication change   Contact: [phone]      ----- Message -----  From: Tameka Fisher  Sent: 3/14/2024   1:23 PM CDT  To: Em Triage Support  Subject: request medication change                        Escitalopram is not working for my depression and anxiety. I still feel anxious and depressed. I need to switch to a different type of medication that will work for my depression and anxiety.

## 2024-03-14 NOTE — TELEPHONE ENCOUNTER
Please review "[employer]" message       OV 1/23/24  (F32.A) Mild depression  Plan: pt now working part time and per spouse, when pt at home when not at work has become lonely; no social activities per wife. Denies suicidal thoughts. Advised to increase lexapro to 10mg daily;  call back if no improvement or worsens.

## 2024-03-15 NOTE — TELEPHONE ENCOUNTER
Spoke to patient's daughter Renata (on ANA), who has the same contact number as patient's mobile number, verified Name and . Relayed Dr. Chen's message below. Pharmacy confirmed. Order for Zoloft placed. Medication list updated. Verbalized understanding and had no further questions at this time.

## 2024-04-16 NOTE — TELEPHONE ENCOUNTER
Last seen: 2/15/24    Next appt: 5/16/24    Last filled: 3/8/24 for 30-day supply with 1 refill   Per wife of patient, patient has an appointment with Dr Kev Metzger on 12/29/2021 and would like to come see Dr Karle Meckel prior to see Dr Kev Metzger but the earliest [facility] Physical appointment is on 12/30/2021. Please advise, Thank you.     Please reply to pool: EM CC IM FM ALG RHE

## 2024-04-22 ENCOUNTER — LAB ENCOUNTER (OUTPATIENT)
Dept: LAB | Age: 69
End: 2024-04-22
Attending: INTERNAL MEDICINE
Payer: MEDICARE

## 2024-04-22 DIAGNOSIS — R79.89 ELEVATED LFTS: ICD-10-CM

## 2024-04-22 LAB
ALBUMIN SERPL-MCNC: 4.6 G/DL (ref 3.2–4.8)
ALP LIVER SERPL-CCNC: 75 U/L
ALT SERPL-CCNC: 31 U/L
AST SERPL-CCNC: 27 U/L (ref ?–34)
BILIRUB DIRECT SERPL-MCNC: 0.2 MG/DL (ref ?–0.3)
BILIRUB SERPL-MCNC: 0.6 MG/DL (ref 0.2–1.1)
HBV CORE AB SERPL QL IA: REACTIVE
HBV SURFACE AB SER QL: NONREACTIVE
HBV SURFACE AB SERPL IA-ACNC: 6.82 MIU/ML
HBV SURFACE AG SER-ACNC: >1000 [IU]/L
HBV SURFACE AG SERPL QL IA: REACTIVE
HCV AB SERPL QL IA: NONREACTIVE
PROT SERPL-MCNC: 7.1 G/DL (ref 5.7–8.2)

## 2024-04-22 PROCEDURE — 86705 HEP B CORE ANTIBODY IGM: CPT

## 2024-04-22 PROCEDURE — 86803 HEPATITIS C AB TEST: CPT

## 2024-04-22 PROCEDURE — 87340 HEPATITIS B SURFACE AG IA: CPT

## 2024-04-22 PROCEDURE — 86706 HEP B SURFACE ANTIBODY: CPT

## 2024-04-22 PROCEDURE — 36415 COLL VENOUS BLD VENIPUNCTURE: CPT

## 2024-04-22 PROCEDURE — 86704 HEP B CORE ANTIBODY TOTAL: CPT

## 2024-04-22 PROCEDURE — 80076 HEPATIC FUNCTION PANEL: CPT

## 2024-04-23 ENCOUNTER — TELEPHONE (OUTPATIENT)
Dept: INTERNAL MEDICINE CLINIC | Facility: CLINIC | Age: 69
End: 2024-04-23

## 2024-04-23 DIAGNOSIS — R79.89 ELEVATED LFTS: Primary | ICD-10-CM

## 2024-04-23 LAB — HBV CORE IGM SER QL: NONREACTIVE

## 2024-04-24 ENCOUNTER — TELEPHONE (OUTPATIENT)
Dept: INTERNAL MEDICINE CLINIC | Facility: CLINIC | Age: 69
End: 2024-04-24

## 2024-04-24 DIAGNOSIS — B19.10 HEPATITIS B VIRUS INFECTION, UNSPECIFIED CHRONICITY: ICD-10-CM

## 2024-04-24 DIAGNOSIS — R76.8 HEPATITIS B SURFACE ANTIGEN POSITIVE: Primary | ICD-10-CM

## 2024-04-24 DIAGNOSIS — B19.10 HEPATITIS B INFECTION WITHOUT DELTA AGENT WITHOUT HEPATIC COMA, UNSPECIFIED CHRONICITY: ICD-10-CM

## 2024-05-30 NOTE — TELEPHONE ENCOUNTER
Refill passed per [facility] protocol.  Requested Prescriptions   Pending Prescriptions Disp Refills    omega-3 fatty acids 1000 MG Oral Cap  0     Sig: Take 1,000 mg by mouth daily.       There is no refill protocol information for this order       sertraline (ZOLOFT) 50 MG Oral Tab 30 tablet 1     Sig: Take 1 tablet (50 mg total) by mouth daily.       Psychiatric Non-Scheduled (Anti-Anxiety) Passed - 5/28/2024  2:35 PM        Passed - In person appointment or virtual visit in the past 6 mos or appointment in next 3 mos     Recent Outpatient Visits              4 months ago Medicare annual wellness visit, subsequent    [facility]    Office Visit    1 year ago Medicare annual wellness visit, subsequent    [facility]    Office Visit    2 years ago Medicare annual wellness visit, initial    [facility]    Office Visit    3 years ago Annual physical exam    [facility]    Office Visit    4 years ago Annual physical exam    [facility]    Office Visit                      Passed - Depression Screening completed within the past 12 months             Recent Outpatient Visits              4 months ago Medicare annual wellness visit, subsequent    [facility]    Office Visit    1 year ago Medicare annual wellness visit, subsequent    [facility]    Office Visit    2 years ago Medicare annual wellness visit, [facility]    Office Visit    3 years ago Annual physical exam    [facility]  Josh Pruitt Addison Linchangco, Emmanuel, MD    Office Visit    4 years ago Annual physical exam    [facility] [place]Josh Addison Linchangco, Emmanuel, MD    Office Visit

## 2024-05-30 NOTE — TELEPHONE ENCOUNTER
Please review; protocol failed/No Protocol    Last Office Visit: 01/23/2024    Requested Prescriptions   Pending Prescriptions Disp Refills    omega-3 fatty acids 1000 MG Oral Cap  0     Sig: Take 1,000 mg by mouth daily.       There is no refill protocol information for this order      Signed Prescriptions Disp Refills    sertraline (ZOLOFT) 50 MG Oral Tab 90 tablet 0     Sig: Take 1 tablet (50 mg total) by mouth daily.       Psychiatric Non-Scheduled (Anti-Anxiety) Passed - 5/28/2024  2:35 PM        Passed - In person appointment or virtual visit in the past 6 mos or appointment in next 3 mos     Recent Outpatient Visits              4 months ago Medicare annual wellness visit, subsequent    [facility] [place] [facility] Emmanuel, [facility]    Office Visit    1 year ago Medicare annual wellness visit, subsequent    [facility] [place] [facility] Emmanuel, [place]    Office Visit    2 years ago Medicare annual wellness visit, initial    [facility] [place] [facility] Emmanuel, [facility]    Office Visit    3 years ago Annual physical exam    [facility]    Office Visit    4 years ago Annual physical exam    [facility] [place] [facility] Emmanuel, [place]    Office Visit                      Passed - Depression Screening completed within the past 12 months             Recent Outpatient Visits              4 months ago Medicare annual wellness visit, subsequent    [facility] [place] Que[place] Emmanuel, MD    Office Visit    1 year ago Medicare annual wellness visit, subsequent    [facility] [place] Que[facility] Emmanuel, MD    Office Visit    2 years ago Medicare annual wellness visit, initial    [facility] [place] Que[facility] Emmanuel, MD     Office Visit    3 years ago Annual physical exam    [facility] [address], Frank Verdin MD    Office Visit    4 years ago Annual physical exam    [facility], [address], Frank Verdin MD    Office Visit

## 2024-05-31 RX ORDER — CHLORAL HYDRATE 500 MG
1000 CAPSULE ORAL DAILY
Qty: 90 CAPSULE | Refills: 3 | Status: SHIPPED | OUTPATIENT
Start: 2024-05-31

## 2024-07-02 ENCOUNTER — TELEPHONE (OUTPATIENT)
Dept: INTERNAL MEDICINE CLINIC | Facility: CLINIC | Age: 69
End: 2024-07-02

## 2024-07-02 NOTE — TELEPHONE ENCOUNTER
I called and left message for nakita to call us back -dtr on ANA and seems we talk with her about results    I did call number for patient, it rang a few times then went to a busy signal - unable to reach patient.

## 2024-07-02 NOTE — TELEPHONE ENCOUNTER
Pls call pt/spouse; I got note from insurance pt is taking both   Escitalopram and sertraline  I had advised pt to switch to sertraline 50mg po daily since March 14,2024 and should had stopped taking escitalopram.

## 2024-07-02 NOTE — TELEPHONE ENCOUNTER
Patient daughter Renata  returned our call ( Identified name and date of birth )   Renata states the patient is only taking the Sertraline and stopped the Escitalopram when directed to do so     Routing as For Your Information

## 2024-08-05 ENCOUNTER — LAB ENCOUNTER (OUTPATIENT)
Dept: LAB | Age: 69
End: 2024-08-05
Attending: INTERNAL MEDICINE
Payer: MEDICARE

## 2024-08-05 ENCOUNTER — TELEPHONE (OUTPATIENT)
Dept: INTERNAL MEDICINE CLINIC | Facility: CLINIC | Age: 69
End: 2024-08-05

## 2024-08-05 DIAGNOSIS — E78.5 HYPERLIPIDEMIA ASSOCIATED WITH TYPE 2 DIABETES MELLITUS (HCC): ICD-10-CM

## 2024-08-05 DIAGNOSIS — E11.69 HYPERLIPIDEMIA ASSOCIATED WITH TYPE 2 DIABETES MELLITUS (HCC): Primary | ICD-10-CM

## 2024-08-05 DIAGNOSIS — I25.10 CORONARY ARTERY DISEASE INVOLVING NATIVE CORONARY ARTERY OF NATIVE HEART WITHOUT ANGINA PECTORIS: ICD-10-CM

## 2024-08-05 DIAGNOSIS — B19.10 HEPATITIS B VIRUS INFECTION, UNSPECIFIED CHRONICITY: ICD-10-CM

## 2024-08-05 DIAGNOSIS — B19.10 HEPATITIS B VIRUS INFECTION: Primary | ICD-10-CM

## 2024-08-05 DIAGNOSIS — E11.69 HYPERLIPIDEMIA ASSOCIATED WITH TYPE 2 DIABETES MELLITUS (HCC): ICD-10-CM

## 2024-08-05 DIAGNOSIS — E78.5 HYPERLIPIDEMIA ASSOCIATED WITH TYPE 2 DIABETES MELLITUS (HCC): Primary | ICD-10-CM

## 2024-08-05 LAB
ALBUMIN SERPL-MCNC: 4.5 G/DL (ref 3.2–4.8)
ALBUMIN/GLOB SERPL: 2 {RATIO} (ref 1–2)
ALP LIVER SERPL-CCNC: 79 U/L
ALT SERPL-CCNC: 36 U/L
ANION GAP SERPL CALC-SCNC: 7 MMOL/L (ref 0–18)
AST SERPL-CCNC: 27 U/L (ref ?–34)
BILIRUB SERPL-MCNC: 0.8 MG/DL (ref 0.2–1.1)
BUN BLD-MCNC: 15 MG/DL (ref 9–23)
BUN/CREAT SERPL: 21.1 (ref 10–20)
CALCIUM BLD-MCNC: 9.7 MG/DL (ref 8.7–10.4)
CHLORIDE SERPL-SCNC: 108 MMOL/L (ref 98–112)
CHOLEST SERPL-MCNC: 146 MG/DL (ref ?–200)
CO2 SERPL-SCNC: 28 MMOL/L (ref 21–32)
CREAT BLD-MCNC: 0.71 MG/DL
EGFRCR SERPLBLD CKD-EPI 2021: 101 ML/MIN/1.73M2 (ref 60–?)
GLOBULIN PLAS-MCNC: 2.2 G/DL (ref 2–3.5)
GLUCOSE BLD-MCNC: 104 MG/DL (ref 70–99)
HAV AB SER QL IA: REACTIVE
HAV IGM SER QL: NONREACTIVE
HDLC SERPL-MCNC: 44 MG/DL (ref 40–59)
LDLC SERPL CALC-MCNC: 78 MG/DL (ref ?–100)
NONHDLC SERPL-MCNC: 102 MG/DL (ref ?–130)
OSMOLALITY SERPL CALC.SUM OF ELEC: 297 MOSM/KG (ref 275–295)
POTASSIUM SERPL-SCNC: 4.9 MMOL/L (ref 3.5–5.1)
PROT SERPL-MCNC: 6.7 G/DL (ref 5.7–8.2)
SODIUM SERPL-SCNC: 143 MMOL/L (ref 136–145)
TRIGL SERPL-MCNC: 136 MG/DL (ref 30–149)
VLDLC SERPL CALC-MCNC: 21 MG/DL (ref 0–30)

## 2024-08-05 PROCEDURE — 86708 HEPATITIS A ANTIBODY: CPT

## 2024-08-05 PROCEDURE — 87517 HEPATITIS B DNA QUANT: CPT

## 2024-08-05 PROCEDURE — 80053 COMPREHEN METABOLIC PANEL: CPT

## 2024-08-05 PROCEDURE — 80061 LIPID PANEL: CPT

## 2024-08-05 PROCEDURE — 86692 HEPATITIS DELTA AGENT ANTBDY: CPT

## 2024-08-05 PROCEDURE — 86709 HEPATITIS A IGM ANTIBODY: CPT

## 2024-08-05 PROCEDURE — 87389 HIV-1 AG W/HIV-1&-2 AB AG IA: CPT

## 2024-08-05 PROCEDURE — 86707 HEPATITIS BE ANTIBODY: CPT

## 2024-08-05 PROCEDURE — 36415 COLL VENOUS BLD VENIPUNCTURE: CPT

## 2024-08-05 PROCEDURE — 87350 HEPATITIS BE AG IA: CPT

## 2024-08-05 NOTE — TELEPHONE ENCOUNTER
Called reference lab and added on the lipid panel and the CMP to the current blood work from today. (Currently in process). Daughter Renata(on HIPAA) advised.

## 2024-08-05 NOTE — TELEPHONE ENCOUNTER
Daughter (ANA) called, verified patient's name and date of birth .   Patient had cardiac bypass few years ago and he is seeing the cardiology every year .   He is scheduled to see the cardiologist  this month Aug . And usually , the specialist would like recent lab test results .     Daughter would like to know  if Dr Chen could order lipid panel or other blood tests since the last test was more than 6 month ago.         No future appointments.    Per chart review,patient was seen on 1/23/24 for Medicare physical and routine annual blood tests were done at the same date .   Patient was in the lab earlier and did the other lab tests.

## 2024-08-06 LAB
HEP BE AB: REACTIVE
HEP BE AG: NEGATIVE

## 2024-08-12 LAB
HBV DNA DETECT, QN LOG: 1.85 {LOG_IU}/ML
HBV DNA DETECT, QN: 71 [IU]/ML

## 2024-08-16 ENCOUNTER — TELEPHONE (OUTPATIENT)
Dept: INTERNAL MEDICINE CLINIC | Facility: CLINIC | Age: 69
End: 2024-08-16

## 2024-08-16 NOTE — TELEPHONE ENCOUNTER
Patients daughter called and is asking if dr or nurse can review recent blood tests with the patient and provide necessary referrals.

## 2024-08-16 NOTE — TELEPHONE ENCOUNTER
Dr. Chen   Patient had testing done 8/5/24 and I don't see results notes for these. Have you reached patient or how would you like to proceed?

## 2024-08-16 NOTE — TELEPHONE ENCOUNTER
His lipid panel was good.   His chem panel was also good; liver enzymes went back to normal  His screening test for HIV negative and he is already immune to hepatitis A.  His hep B viral titer was positive; he needs to see one of our GI Dr Hendrix and partners as advised before.   I also had advised before that pt's spouse should be checked for hepatitis B since it can be sexually transmitted, the order for labs had been in chart since April 2024;  precautions for bodily fluids as hep b can be transmitted with this.

## 2024-08-21 NOTE — TELEPHONE ENCOUNTER
Please review. Protocol Failed; No Protocol    Requested Prescriptions   Pending Prescriptions Disp Refills    SERTRALINE 50 MG Oral Tab [Pharmacy Med Name: Sertraline HCl 50 MG Oral Tablet] 90 tablet 0     Sig: Take 1 tablet by mouth once daily       Psychiatric Non-Scheduled (Anti-Anxiety) Failed - 8/20/2024  1:05 PM        Failed - In person appointment or virtual visit in the past 6 mos or appointment in next 3 mos     Recent Outpatient Visits              7 months ago Medicare annual [facility], [facility]    Office Visit    1 year ago Medicare annual wellness visit, subsequent    [facility] Frank Verdin MD    Office Visit    2 years ago Medicare annual wellness visit, initial    [facility] Frank Verdin MD    Office Visit    3 years ago Annual physical exam    [facility] Frank Verdin MD    Office Visit    4 years ago Annual physical exam    [facility]    [facility]          Future Appointments         Provider Department Appt Notes    In 5 months Sreedhar Hendrix MD [facility] Hepatitis B virus infection, unspecified chronicity (policy informed)                    Passed - Depression Screening completed within the past 12 months               Future Appointments         Provider Department Appt Notes    [facility] 5 [facility] virus infection, unspecified chronicity (policy informed)          Recent Outpatient Visits              7 months ago Medicare annual wellness visit, subsequent    [facility] Frank Verdin MD    Office Visit    1  [facility]    Office Visit    2 years ago Medicare annual wellness visit, initial    [facility]    Office Visit    3 years ago [facility] physical exam    [facility]    Office Visit    4 years ago Annual physical exam    [facility]    Office Visit

## 2024-08-22 ENCOUNTER — TELEPHONE (OUTPATIENT)
Dept: INTERNAL MEDICINE CLINIC | Facility: CLINIC | Age: 69
End: 2024-08-22

## 2024-08-22 DIAGNOSIS — R76.8 HEPATITIS B SURFACE ANTIGEN POSITIVE: Primary | ICD-10-CM

## 2024-08-22 DIAGNOSIS — R79.89 ELEVATED LFTS: ICD-10-CM

## 2024-08-22 DIAGNOSIS — B19.10 HEPATITIS B INFECTION WITHOUT DELTA AGENT WITHOUT HEPATIC COMA, UNSPECIFIED CHRONICITY: ICD-10-CM

## 2024-08-22 NOTE — TELEPHONE ENCOUNTER
since his liver enzymes had actually normalized  already so it think it's safe to wait til Oct 2024.

## 2024-08-22 NOTE — TELEPHONE ENCOUNTER
Renata (daughter-ANA===verified patient's name and date of birth ) called back and wants to follow up regarding her earlier message.   She is requesting to fax the GI referral to Govind Mac at [phone].They would be able to see the patient on Oct 20.Patient is gong to [facility] and will be back on 10/10/24.     RN faxed the referral via RIGHT Fax today .     Chart Review Routing History    Recipients Sent On Sent By Routed Reports    Dr Anson Mac ==Duly    8/22/2024  1:15 PM Karmen Lord RN OFFICE VISIT - Auth/Cert # [id number] (Authorized)               Future Appointments   Date Time Provider Department Center   1/27/2025  9:00 AM Sreedhar Hendrix MD [facility]

## 2024-08-22 NOTE — TELEPHONE ENCOUNTER
Received call from daughter on ANA regarding GI referral.  States Dr. Hendrix is unable to see him until January.  Prefers to see a doctor, not a nurse practitioner. Found another doctor who can see him in October, inquires on new referral and if it is ok to wait until then to be seen.  Pended for signature.  Please advise.

## 2024-08-22 NOTE — TELEPHONE ENCOUNTER
Renata notified with understanding.  She will discuss GI options with her father, she thinks he really wants to see Dr. Hendrix but is unsure about waiting until January.  She will discuss with him and keep clinic apprised of his decision.

## 2025-01-07 ENCOUNTER — PATIENT MESSAGE (OUTPATIENT)
Dept: INTERNAL MEDICINE CLINIC | Facility: CLINIC | Age: 70
End: 2025-01-07

## 2025-01-08 ENCOUNTER — OFFICE VISIT (OUTPATIENT)
Facility: CLINIC | Age: 70
End: 2025-01-08
Payer: MEDICARE

## 2025-01-08 VITALS
HEIGHT: 65 IN | HEART RATE: 56 BPM | BODY MASS INDEX: 31.6 KG/M2 | SYSTOLIC BLOOD PRESSURE: 144 MMHG | DIASTOLIC BLOOD PRESSURE: 78 MMHG | WEIGHT: 189.69 LBS

## 2025-01-08 DIAGNOSIS — B18.1 CHRONIC HEPATITIS B (HCC): Primary | ICD-10-CM

## 2025-01-08 DIAGNOSIS — Z12.11 COLON CANCER SCREENING: ICD-10-CM

## 2025-01-08 DIAGNOSIS — R74.8 ABNORMAL TRANSAMINASES: ICD-10-CM

## 2025-01-08 PROBLEM — J41.0 SMOKERS' COUGH (HCC): Chronic | Status: ACTIVE | Noted: 2025-01-08

## 2025-01-08 PROCEDURE — 1159F MED LIST DOCD IN RCRD: CPT | Performed by: INTERNAL MEDICINE

## 2025-01-08 PROCEDURE — 3078F DIAST BP <80 MM HG: CPT | Performed by: INTERNAL MEDICINE

## 2025-01-08 PROCEDURE — 3008F BODY MASS INDEX DOCD: CPT | Performed by: INTERNAL MEDICINE

## 2025-01-08 PROCEDURE — 3077F SYST BP >= 140 MM HG: CPT | Performed by: INTERNAL MEDICINE

## 2025-01-08 PROCEDURE — 99204 OFFICE O/P NEW MOD 45 MIN: CPT | Performed by: INTERNAL MEDICINE

## 2025-01-08 RX ORDER — ENTECAVIR 0.5 MG/1
0.5 TABLET, FILM COATED ORAL DAILY
Qty: 90 TABLET | Refills: 3 | Status: SHIPPED | OUTPATIENT
Start: 2025-01-08 | End: 2025-04-08

## 2025-01-08 NOTE — PROGRESS NOTES
** Scroll down for HPI. **    ASSESSMENT/PLAN:     69 year old Liberian speaking gentleman with BMI 31.6, history Type 2 diabetes dyslipidemia coronary artery disease and CABG revascularization surgery here in August 2011.    Mr. Fisher is referred to us by Dr. Frank Chen and seen 1/8/2025 for evaluation of Chronic Hepatitis B infection.    Suggest:    Chronic Hepatitis B infection, chronic inactive HBV infection    Reassuring serum albumin and repeated normal platelet counts would argue against cirrhosis.  No recent abdominal/liver imaging here or elsewhere.    CMP 8/5/2024:  BUN 15, creatinine 0.71  AST 27 ALT 36 alk phosphatase 79  Serum albumin 4.5, total bilirubin 6.7  Above serum albumin, repeatedly normal platelet counts would argue against advanced hepatic fibrosis/cirrhosis    Negative Hep C antibody 4/22/2024    Hepatitis B serologies 4/22/2024  Positive HBV surface antigen/ negative surface antibody  Positive HBV core antibody    HBV labs 8/5/2024  Positive HBV e Ab  Negative HBV e Ag  Hep B DNA 71  [IU]/mL  HBV DNA Quant Log 1.85  Log_IU /mL   Negative HDV Antibody, IgG/IgM          \"No laboratory evidence of hepatitis D virus (HDV) infection or past exposure to HDV. \"    Negative delta agent assay as above  Significant alcohol consumption history which we briefly reviewed today  Previous tobacco smoker    Ultrasound elastography of liver ordered today.    At age 69, I recommended starting antiviral therapy to prevent progression to cirrhosis and prevent HCC hepatocellular cancer.    Mr. Fisher and his family agree.  Entecavir 0.5 mg daily prescribed today to this treatment naïve patient.      Chronic Hep B infection, hepatocellular cancer screening     Risk of hepatocellular cancer with Chronic Hepatitis B discussed today  Lowering that risk to nearly zero with effective antiviral therapy explained today.  Screening with alpha-fetoprotein and ultrasound every 6 months explained and ordered  today.  Ultrasound of the liver with elastography ordered at this time.    Colon cancer screening, former tobacco smoker     Reassuring previous colonoscopy examination 11 years ago with Epi Barnett MD   Overdue for next screening colonoscopy examination.  As today's visit was a consultation regarding Chronic Hepatitis B, advised Mr. Fisher and family that we should discuss this at his follow-up visit, otherwise call us sooner if they wish to schedule by phone.              Office visit 1/8/2025:  HPI:    Patient ID: Tameka Fisher is a 69 year old Maori speaking gentleman with BMI 31.6, history Type 2 diabetes dyslipidemia coronary artery disease and CABG revascularization surgery here in August 2011.    His English speaking daughter translates for us by a FaceTime phone call today.  We had good video images and she speaks perfect English.    Mr. Fisher is referred to us by Dr. Frank Chen for evaluation of Chronic Hepatitis B infection.    Unclear whether he was aware of this diagnosis back in [facility].  No known exposures prior to the heart surgery and transfusions in 2011, no previous blood transfusions.    No known history of acute hepatitis/ jaundice/hepatic decompensations.    Labs reviewed above consistent with chronic inactive hepatitis B infection, low HBV DNA viral load; HBV e Ab positive.    No recent abdominal or liver imaging.  Liver ultrasound was ordered last year but not completed.    On discussion today, family is not aware of previous diagnosis or where Mr. Fisher would have acquired the infection.  No previous treatment for chronic Hepatitis B.    Daughter does describe a significant (ongoing) alcohol consumption pattern, several drinks of vodka possibly other drinks per week.    Recent labs reviewed together including reassuring chemistries and platelet count; last colonoscopy examination January 2014 reviewed together today.    Labs and Hepatitis B data:  CMP 8/5/2024:  BUN  15, creatinine 0.71  AST 27 ALT 36 alk phosphatase 79  Serum albumin 4.5, total bilirubin 6.7    CBC 1/23/2024 shows WBC 4300, hemoglobin 14.1g     CMP's past 10 years have shown AST ranging 22-38, ALT ranging 24-51    CBCs past 10 years have showed exclusively normal platelet counts    Wt Readings from Last 20 Encounters:   01/08/25 189 lb 11.2 oz (86 kg)   01/23/24 191 lb 4.8 oz (86.8 kg)   01/04/23 187 lb 3.2 oz (84.9 kg)   12/23/21 188 lb 4.8 oz (85.4 kg)   12/02/20 186 lb 8 oz (84.6 kg)   11/27/19 183 lb (83 kg)   02/15/19 187 lb (84.8 kg)   10/25/18 187 lb (84.8 kg)   10/11/17 187 lb (84.8 kg)   09/06/16 182 lb (82.6 kg)   08/17/15 181 lb 3.2 oz (82.2 kg)   09/23/14 186 lb 6.4 oz (84.6 kg)         Previous EGD examination: n  Previous colonoscopy(ies): 1/13/2014    [facility]    Review of Systems    =======================    REINALDO SHAH     ADMITTED:   01/13/2014        GI PROCEDURE     DATE OF PROCEDURE:  01/13/2014     PROCEDURE:  Colonoscopy with cold biopsy polypectomy.     PREOPERATIVE DIAGNOSES:  1.  Rectal bleeding.  2.  Anemia.     POSTOPERATIVE DIAGNOSES:  1.  Internal hemorrhoids.  2.  Rectal polyp.  3.  Sigmoid diverticulosis.     PROCEDURE:  The patient was brought to the Endoscopy Suite.  He   was placed in the left lateral decubitus position. He received 100 mcg of   Fentanyl, 3 mg of Versed IV push in divided doses for sedation.  Digital    Examination was performed.  No abnormalities were noted.  Olympus video   colonoscope was inserted into the rectum and guided to the sigmoid.  Ileocecal   valve and appendiceal orifice were identified and appeared normal.  Prep was   good.    The endoscope was then carefully withdrawn observing a normal   right transverse colon.  As the endoscope was withdrawn to the left   colon, some diverticulosis was noted.  The endoscope was then withdrawn to the   rectum where a U-turn maneuver was performed and the floor of the rectum   revealed small polyps.  This  was removed with cold biopsy forceps.  There   were also internal hemorrhoids.  The endoscope was then righted and   withdrawn.  No other abnormalities were noted.     Final findings were consistent with sigmoid diverticulosis,   internal hemorrhoids and a rectal polyp.           D:    01/13/2014 9:42 A  T:    01/13/2014 10:34 A  ATTENDING:  Epi Barnett MD [id number]  DICTATING:    Epi Barnett MD 0616  MD ID #:      [id number]  cc:   Epi Barnett MD 0616        LEYLA Chen MD 1039         Current Outpatient Medications   Medication Sig Dispense Refill    sertraline 50 MG Oral Tab Take 1 tablet (50 mg total) by mouth daily. 90 tablet 1    omega-3 fatty acids 1000 MG Oral Cap Take 1,000 mg by mouth daily. 90 capsule 3    ezetimibe 10 MG Oral Tab Take 1 tablet (10 mg total) by mouth nightly. 90 tablet 3    atorvastatin 80 MG Oral Tab Take 1 tablet (80 mg total) by mouth nightly. 90 tablet 1    Glucose Blood (LUIS CONTOUR TEST) In Vitro Strip Check glucose daily 100 strip 1    Bisoprolol Fumarate (ZEBETA) 5 MG Oral Tab Take 1 tablet (5 mg total) by mouth daily.      docusate sodium 100 MG Oral Cap Take 2 capsules (200 mg total) by mouth daily.      aspirin 81 MG Oral Tab Take 1 tablet (81 mg total) by mouth daily.           Allergies:Allergies[1]  Imaging: No results found.       PHYSICAL EXAM:   Physical Exam                   Over 45 minutes spent today discussing the above and counseling and educating this patient and family, reviewing chart notes and data and documenting clinical information in the EHR, independently interpreting results and communicating results to the patient/family and composing this comprehensive note, ordering medications or testing, referring and communicating with other healthcare providers, and care coordination with the patient's other providers.      Digital transcription software was utilized to produce this note. The note was proofread for content only.  Typographical errors may remain.       Meds This Visit:  Requested Prescriptions      No prescriptions requested or ordered in this encounter       Imaging & Referrals:  None       ID#[id number]         [1] No Known Allergies

## 2025-01-08 NOTE — TELEPHONE ENCOUNTER
See alternative encounter with additional information. Advised patient to call office     Last read by Tameka Fisher at  8:43 AM on 1/8/2025.

## 2025-01-08 NOTE — PATIENT INSTRUCTIONS
Todays instructions:    Call to schedule liver ultrasound  Start new ENTECAVIR medication  You will need a blood test and ultrasound every 6 months to watch your liver  Schedule follow up visit in 4-6 months  We should discuss colonoscopy next time  
Outside Hospital ([facility])

## 2025-01-09 ENCOUNTER — TELEPHONE (OUTPATIENT)
Dept: INTERNAL MEDICINE CLINIC | Facility: CLINIC | Age: 70
End: 2025-01-09

## 2025-01-09 NOTE — TELEPHONE ENCOUNTER
Condition update:  Patient's daughter called, verified patient's name/.  She wanted to clarify her Tier[employer] message sent on behalf of her father.  States he saw his liver doctor yesterday and patient  has vowed to stop drinking. States in the past he gave up smoking abrubtly and she believes he will do the same with alcohol use.  States he is not not suicidal or abusive.    He has an upcoming appointment with Dr Chen where she will discuss sertraline with the doctor and whether he needs to see a specialist.  This RN gave her phone numbers to [facility] if she wishes to discuss coverage and services.  Daughter stated understanding.  Patient has appointment with Dr Chen  and she will wait for appointment to discuss if patient needs referrals.    Future Appointments   Date Time Provider Department Center   2025 10:00 AM [facility]           2025  Tameka Fisher to P Em Rn Triage (supporting Frank Chen MD)         25 10:36 AM  Sleep and mental state has improved a lot since he stopped drinking. He will follow up with doctor when needed.   Tameka Fisher to P Em Rn Triage (supporting Frank Chen MD)         25 10:33 AM  Tameka quit drinking alcohol on 2025 to protect liver health.   He wants to wait on changing sertaline. He will follow up with doctor as needed.

## 2025-01-13 ENCOUNTER — OFFICE VISIT (OUTPATIENT)
Dept: INTERNAL MEDICINE CLINIC | Facility: CLINIC | Age: 70
End: 2025-01-13
Payer: MEDICARE

## 2025-01-13 VITALS
DIASTOLIC BLOOD PRESSURE: 68 MMHG | HEIGHT: 65 IN | BODY MASS INDEX: 31.56 KG/M2 | SYSTOLIC BLOOD PRESSURE: 120 MMHG | WEIGHT: 189.44 LBS | HEART RATE: 63 BPM

## 2025-01-13 DIAGNOSIS — Z12.5 PROSTATE CANCER SCREENING: ICD-10-CM

## 2025-01-13 DIAGNOSIS — E11.69 HYPERLIPIDEMIA ASSOCIATED WITH TYPE 2 DIABETES MELLITUS (HCC): ICD-10-CM

## 2025-01-13 DIAGNOSIS — E78.5 HYPERLIPIDEMIA ASSOCIATED WITH TYPE 2 DIABETES MELLITUS (HCC): ICD-10-CM

## 2025-01-13 DIAGNOSIS — R22.41 MASS OF THIGH, RIGHT: ICD-10-CM

## 2025-01-13 DIAGNOSIS — Z12.11 COLON CANCER SCREENING: ICD-10-CM

## 2025-01-13 DIAGNOSIS — Z87.891 HISTORY OF TOBACCO ABUSE: ICD-10-CM

## 2025-01-13 DIAGNOSIS — I25.10 CORONARY ARTERY DISEASE INVOLVING NATIVE CORONARY ARTERY OF NATIVE HEART WITHOUT ANGINA PECTORIS: ICD-10-CM

## 2025-01-13 DIAGNOSIS — E11.9 CONTROLLED TYPE 2 DIABETES MELLITUS WITHOUT COMPLICATION, WITHOUT LONG-TERM CURRENT USE OF INSULIN (HCC): ICD-10-CM

## 2025-01-13 DIAGNOSIS — E11.9 DIABETIC EYE EXAM (HCC): ICD-10-CM

## 2025-01-13 DIAGNOSIS — B18.1 CHRONIC HEPATITIS B (HCC): ICD-10-CM

## 2025-01-13 DIAGNOSIS — Z00.00 MEDICARE ANNUAL WELLNESS VISIT, SUBSEQUENT: Primary | ICD-10-CM

## 2025-01-13 DIAGNOSIS — F32.A MILD DEPRESSION: ICD-10-CM

## 2025-01-13 DIAGNOSIS — Z53.20 LUNG CANCER SCREENING DECLINED BY PATIENT: ICD-10-CM

## 2025-01-13 DIAGNOSIS — Z01.00 DIABETIC EYE EXAM (HCC): ICD-10-CM

## 2025-01-13 DIAGNOSIS — H25.9 AGE-RELATED CATARACT OF BOTH EYES, UNSPECIFIED AGE-RELATED CATARACT TYPE: ICD-10-CM

## 2025-01-13 DIAGNOSIS — I73.9 PAD (PERIPHERAL ARTERY DISEASE) (HCC): ICD-10-CM

## 2025-01-13 DIAGNOSIS — I83.811 VARICOSE VEINS OF LEG WITH PAIN, RIGHT: ICD-10-CM

## 2025-01-13 LAB — HEMOGLOBIN A1C: 5.7 % (ref 4.3–5.6)

## 2025-01-13 PROCEDURE — 3008F BODY MASS INDEX DOCD: CPT | Performed by: INTERNAL MEDICINE

## 2025-01-13 PROCEDURE — G0439 PPPS, SUBSEQ VISIT: HCPCS | Performed by: INTERNAL MEDICINE

## 2025-01-13 PROCEDURE — 1160F RVW MEDS BY RX/DR IN RCRD: CPT | Performed by: INTERNAL MEDICINE

## 2025-01-13 PROCEDURE — 3044F HG A1C LEVEL LT 7.0%: CPT | Performed by: INTERNAL MEDICINE

## 2025-01-13 PROCEDURE — 99499 UNLISTED E&M SERVICE: CPT | Performed by: INTERNAL MEDICINE

## 2025-01-13 PROCEDURE — 3078F DIAST BP <80 MM HG: CPT | Performed by: INTERNAL MEDICINE

## 2025-01-13 PROCEDURE — 96160 PT-FOCUSED HLTH RISK ASSMT: CPT | Performed by: INTERNAL MEDICINE

## 2025-01-13 PROCEDURE — 1125F AMNT PAIN NOTED PAIN PRSNT: CPT | Performed by: INTERNAL MEDICINE

## 2025-01-13 PROCEDURE — G0008 ADMIN INFLUENZA VIRUS VAC: HCPCS | Performed by: INTERNAL MEDICINE

## 2025-01-13 PROCEDURE — 90662 IIV NO PRSV INCREASED AG IM: CPT | Performed by: INTERNAL MEDICINE

## 2025-01-13 PROCEDURE — 3074F SYST BP LT 130 MM HG: CPT | Performed by: INTERNAL MEDICINE

## 2025-01-13 PROCEDURE — 1170F FXNL STATUS ASSESSED: CPT | Performed by: INTERNAL MEDICINE

## 2025-01-13 PROCEDURE — 83036 HEMOGLOBIN GLYCOSYLATED A1C: CPT | Performed by: INTERNAL MEDICINE

## 2025-01-13 PROCEDURE — 1159F MED LIST DOCD IN RCRD: CPT | Performed by: INTERNAL MEDICINE

## 2025-01-13 NOTE — PROGRESS NOTES
Subjective:   Tameka Fisher is a 69 year old male who presents for a MA AHA (Medicare Advantage Annual Health Assessment) and Medicare Wellness Visit charge within the last 11 months and Patient may not meet criteria for AWV: Please evaluate for correct coding and scheduled follow up of multiple significant but stable problems.       History/Other:   Fall Risk Assessment:   He has been screened for Falls and is low risk.      Cognitive Assessment:   Abnormal  What day of the week is this?: Correct  What month is it?: Correct  What year is it?: Correct  Recall \"Ball\": Incorrect  Recall \"Flag\": Correct  Recall \"Tree\": Correct    Functional Ability/Status:   Tameka Fisher has some abnormal functions as listed below:  He has Vision problems based on screening of functional status.       Depression Screening (PHQ):  PHQ-2 SCORE: 1  , done 1/7/2025   Feeling down, depressed, or hopeless: 1    If you checked off any problems, how difficult have these problems made it for you to do your work, take care of things at home, or get along with other people?: Not difficult at all             Advanced Directives:   He does NOT have a Living Will. [Do you have a living will?: (Patient-Rptd) No]  He does NOT have a Power of  for Health Care. [Do you have a healthcare power of ?: (Patient-Rptd) No]  Discussed Advance Care Planning with patient (and family/surrogate if present). Standard forms made available to patient in After Visit Summary.      Patient Active Problem List   Diagnosis    Coronary artery disease involving native coronary artery of native heart without angina pectoris    Controlled type 2 diabetes mellitus without complication, without long-term current use of insulin (HCC)    Prostate cancer screening    Need for vaccination    Varicose veins of leg with pain, right    Medicare annual wellness visit, subsequent    Diabetic eye exam (HCC)    Hyperlipidemia associated with type 2 diabetes  mellitus (HCC)    Colon cancer screening    Lung cancer screening declined by patient    Mild depression    Smokers' cough (HCC)     Allergies:  He has No Known Allergies.    Current Medications:  Outpatient Medications Marked as Taking for the 1/13/25 encounter (Office Visit) with Frank Chen MD   Medication Sig    entecavir 0.5 MG Oral Tab Take 1 tablet (0.5 mg total) by mouth daily.    sertraline 50 MG Oral Tab Take 1 tablet (50 mg total) by mouth daily.    omega-3 fatty acids 1000 MG Oral Cap Take 1,000 mg by mouth daily.    ezetimibe 10 MG Oral Tab Take 1 tablet (10 mg total) by mouth nightly.    atorvastatin 80 MG Oral Tab Take 1 tablet (80 mg total) by mouth nightly.    Glucose Blood (LUIS CONTOUR TEST) In Vitro Strip Check glucose daily    Bisoprolol Fumarate (ZEBETA) 5 MG Oral Tab Take 1 tablet (5 mg total) by mouth daily.    docusate sodium 100 MG Oral Cap Take 2 capsules (200 mg total) by mouth daily.    aspirin 81 MG Oral Tab Take 1 tablet (81 mg total) by mouth daily.       Medical History:  He  has a past medical history of Atherosclerosis of coronary artery, Coronary artery disease, Esophageal reflux (2011), Heart disease (2011), Mild depression (01/24/2024), Other and unspecified hyperlipidemia, and Prediabetes.  Surgical History:  He  has a past surgical history that includes cabg (2011) and colonoscopy.   Family History:  His family history includes Cancer in his sister; Heart Disease in his father; No Known Problems in his brother and mother.  Social History:  He  reports that he quit smoking about 13 years ago. His smoking use included cigarettes. He has a 30 pack-year smoking history. He has been exposed to tobacco smoke. He has never used smokeless tobacco. He reports current alcohol use of about 7.0 standard drinks of alcohol per week. He reports that he does not use drugs.    Tobacco:  He smoked tobacco in the past but quit greater than 12 months ago.  Social History     Tobacco  Use   Smoking Status Former    Current packs/day: 0.00    Average packs/day: 1 pack/day for 30.0 years (30.0 ttl pk-yrs)    Types: Cigarettes    Quit date: 2011    Years since quittin.6    Passive exposure: Past   Smokeless Tobacco Never   Tobacco Comments    quit 13 yrs ago         CAGE Alcohol Screen:   He has been screened for alcohol abuse and his score is not 0:  Cut: Have you ever felt you should Cut down on your drinking?: (Patient-Rptd) Yes  Annoyed: Have people Annoyed you by criticizing your drinking?: (Patient-Rptd) Yes  Guilty: Have you ever felt bad or Guilty about your drinking?: (Patient-Rptd) Yes  Eye Opener: Have you ever had a drink first thing in the morning to steady your nerves or to get rid of a hangover (Eye opener)?: (Patient-Rptd) No  Total Score: (Patient-Rptd) 3      Patient Care Team:  Frank Chen MD as PCP - General (Internal Medicine)    Review of Systems  GENERAL: feels well otherwise  SKIN: denies any unusual skin lesions  EYES: denies blurred vision or double vision  HEENT: denies nasal congestion, sinus pain or ST  LUNGS: denies shortness of breath with exertion  CARDIOVASCULAR: denies chest pain on exertion  GI: denies abdominal pain, denies heartburn  : 1 per night nocturia, no complaint of urinary incontinence  MUSCULOSKELETAL: denies back pain  NEURO: denies headaches  PSYCHE: denies depression or anxiety  HEMATOLOGIC: denies hx of anemia  ENDOCRINE: denies thyroid history  ALL/ASTHMA: denies hx of allergy or asthma    Objective:   Physical Exam  General Appearance:  Alert, cooperative, no distress, appears stated age   Head:  Normocephalic, without obvious abnormality, atraumatic   Eyes:  PERRL, conjunctiva/corneas clear, EOM's intact, both eyes   Ears:  Normal TM's and external ear canals, both ears   Nose: Nares normal, septum midline, mucosa normal, no drainage or sinus tenderness   Throat: Lips, mucosa, and tongue normal; teeth and gums normal   Neck:  Supple, symmetrical, trachea midline, no adenopathy, thyroid: not enlarged, symmetric, no tenderness/mass/nodules, no carotid bruit or JVD   Back:   Symmetric, no curvature, ROM normal, no CVA tenderness   Lungs:   Clear to auscultation bilaterally, respirations unlabored   Chest Wall:  No tenderness or deformity   Heart:  Regular rate and rhythm, S1, S2 normal, no murmur, rub or gallop   Abdomen:   Soft, non-tender, bowel sounds active all four quadrants,  no masses, no organomegaly   Genitalia: Normal male   Rectal: Normal tone, symmetrical mildly enalrge prostate no nodules/induraton, no masses or tenderness   Extremities: Extremities normal, atraumatic, no cyanosis or edema   Pulses: 1+ and symmetric; monofilament testing normal both feet   Skin: Skin color, texture, turgor normal, no rashes or lesions   Lymph nodes: Cervical, supraclavicular,  nodes normal   Neurologic: Normal     /68 (BP Location: Left arm, Patient Position: Sitting, Cuff Size: adult)   Pulse 63   Ht 5' 5\" (1.651 m)   Wt 189 lb 7 oz (85.9 kg)   BMI 31.52 kg/m²  Estimated body mass index is 31.52 kg/m² as calculated from the following:    Height as of this encounter: 5' 5\" (1.651 m).    Weight as of this encounter: 189 lb 7 oz (85.9 kg).    Medicare Hearing Assessment:   Hearing Screening    Screening Method: Finger Rub  Finger Rub Result: Pass         Visual Acuity:   Right Eye Visual Acuity: Corrected Right Eye Chart Acuity: 20/20   Left Eye Visual Acuity: Corrected Left Eye Chart Acuity: 20/50   Both Eyes Visual Acuity: Corrected Both Eyes Chart Acuity: 20/30   Able To Tolerate Visual Acuity: Yes        Assessment & Plan:   Tameka Fisher is a 69 year old male who presents for a Medicare Assessment.     (Z00.00) Medicare annual wellness visit, subsequent  (primary encounter diagnosis)  Plan: CBC With Differential With Platelet        Routine labs ordered. Pt given flushot today. Advised to get shingrix from phamacy; declined  covid booster    (E11.9) Controlled type 2 diabetes mellitus without complication, without long-term current use of insulin (HCC)  Plan: POC Glycohemoglobin [68107], Microalb/Creat         Ratio, Random Urine, Podiatry Referral -         [facility]        A1c today at 5.7 so dm controlled. Will check urine test; continue with dm diet.     (E11.69,  E78.5) Hyperlipidemia associated with type 2 diabetes mellitus (HCC)  Plan: Comp Metabolic Panel (14), Lipid Panel        Check lipid panel; conitnue with low chol diet and chol statin med.     (I25.10) Coronary artery disease involving native coronary artery of native heart without angina pectoris  Plan: Cardio Referral - Internal        Pt had remote hx of CABG before, did have abnormal stress test done in 2024 but asymptomatic; pt had been advised by cardio before to have LHC but pt had refused. Pt on statin and antiplatelet tx .    (B18.1) Chronic hepatitis B (HCC)  Plan: pt had been seen by GI Dr Hendrix and had been started on entecavir.     (Z01.00,  E11.9) Diabetic eye exam (HCC)  Plan: Ophthalmology Referral - In Network        Pt referred to optha for dm eye exam .    (Z12.5) Prostate cancer screening  Plan: PSA Total, Screen        Check psa.     (Z12.11) Colon cancer screening  Plan: pt due for his colonoscpy and will schedule with his GI DR Hendrix .        (F32.A) Mild depression  Plan: [facility] Referral - In Network        Pt had been treated for depression but feels med not working well for him; pt denies beigin suicidal; will refer him to LO /    (I83.811) Varicose veins of leg with pain, right  Plan: pt ff now by cardiologist. Leg elevation; compression stockeings .    (H25.9) Age-related cataract of both eyes, unspecified age-related cataract type  Plan: Ophthalmology Referral - In Network        Pt had seen opto and was told he has cataract so will refer to optha .    (Z87.891) History of tobacco abuse  Plan: CT LUNG LD  SCREENING(CPT=71271)        Pt is candidate for lung ct screening and now agreed to get it done.     (I73.9) PAD (peripheral artery disease) (HCC)  Plan: US ART LOWER EXT BILAT DOPPLER W SEG PRESSURES         (CPT=93923)        Pt had diminished pedal pulses both feet; will check arterial doppler test.    (R22.41) Mass of thigh, right  Plan: Surgery Referral - In Network        Pt had a soft solid mass about 2.5cm on the medial aspect upper 3rd of the right thigh.     The patient indicates understanding of these issues and agrees to the plan.  Reinforced healthy diet, lifestyle, and exercise.      No follow-ups on file.     Frank Chen MD, 1/13/2025     Supplementary Documentation:   General Health:  In the past six months, have you lost more than 10 pounds without trying?: (Patient-Rptd) 3 - Don't know  Has your appetite been poor?: (Patient-Rptd) No  Type of Diet: (Patient-Rptd) Balanced  How does the patient maintain a good energy level?: (Patient-Rptd) Stretching  How would you describe your daily physical activity?: (Patient-Rptd) Light  How would you describe your current health state?: (Patient-Rptd) Fair  On a scale of 0 to 10, with 0 being no pain and 10 being severe pain, what is your pain level?: (Patient-Rptd) 5 - (Moderate)  In the past six months, have you experienced urine leakage?: (Patient-Rptd) 0-No  At any time do you feel concerned for the safety/well-being of yourself and/or your children, in your home or elsewhere?: (Patient-Rptd) No  Have you had any immunizations at another office such as Influenza, Hepatitis B, Tetanus, or Pneumococcal?: (Patient-Rptd) No    Health Maintenance   Topic Date Due    Zoster Vaccines (1 of 2) Never done    Lung Cancer Screening  Never done    Colorectal Cancer Screening  01/13/2024    COVID-19 Vaccine (4 - 2024-25 season) 09/01/2024    Influenza Vaccine (1) 10/01/2024    Diabetes Care: Foot Exam (Annual)  01/01/2025    Diabetes Care: Microalb/Creat Ratio  (Annual)  01/01/2025    Diabetes Care Dilated Eye Exam  09/15/2025 (Originally 10/25/2018)    Diabetes Care A1C  07/13/2025    Diabetes Care: GFR  08/05/2025    PSA  01/23/2026    Annual Depression Screening  Completed    Fall Risk Screening (Annual)  Completed    Pneumococcal Vaccine: 50+ Years  Completed    Meningococcal B Vaccine  Aged Out    Annual Well Visit  Discontinued

## 2025-01-17 ENCOUNTER — LAB ENCOUNTER (OUTPATIENT)
Dept: LAB | Age: 70
End: 2025-01-17
Attending: INTERNAL MEDICINE
Payer: MEDICARE

## 2025-01-17 DIAGNOSIS — E11.9 CONTROLLED TYPE 2 DIABETES MELLITUS WITHOUT COMPLICATION, WITHOUT LONG-TERM CURRENT USE OF INSULIN (HCC): ICD-10-CM

## 2025-01-17 DIAGNOSIS — E78.5 HYPERLIPIDEMIA ASSOCIATED WITH TYPE 2 DIABETES MELLITUS (HCC): ICD-10-CM

## 2025-01-17 DIAGNOSIS — E11.69 HYPERLIPIDEMIA ASSOCIATED WITH TYPE 2 DIABETES MELLITUS (HCC): ICD-10-CM

## 2025-01-17 DIAGNOSIS — Z00.00 MEDICARE ANNUAL WELLNESS VISIT, SUBSEQUENT: ICD-10-CM

## 2025-01-17 LAB
ALBUMIN SERPL-MCNC: 4.8 G/DL (ref 3.2–4.8)
ALBUMIN/GLOB SERPL: 2.2 {RATIO} (ref 1–2)
ALP LIVER SERPL-CCNC: 69 U/L
ALT SERPL-CCNC: 33 U/L
ANION GAP SERPL CALC-SCNC: 9 MMOL/L (ref 0–18)
AST SERPL-CCNC: 28 U/L (ref ?–34)
BASOPHILS # BLD AUTO: 0.03 X10(3) UL (ref 0–0.2)
BASOPHILS NFR BLD AUTO: 0.7 %
BILIRUB SERPL-MCNC: 0.8 MG/DL (ref 0.2–1.1)
BUN BLD-MCNC: 15 MG/DL (ref 9–23)
BUN/CREAT SERPL: 19.2 (ref 10–20)
CALCIUM BLD-MCNC: 9.8 MG/DL (ref 8.7–10.4)
CHLORIDE SERPL-SCNC: 104 MMOL/L (ref 98–112)
CHOLEST SERPL-MCNC: 120 MG/DL (ref ?–200)
CO2 SERPL-SCNC: 29 MMOL/L (ref 21–32)
CREAT BLD-MCNC: 0.78 MG/DL
CREAT UR-SCNC: 113.9 MG/DL
DEPRECATED RDW RBC AUTO: 42.6 FL (ref 35.1–46.3)
EGFRCR SERPLBLD CKD-EPI 2021: 97 ML/MIN/1.73M2 (ref 60–?)
EOSINOPHIL # BLD AUTO: 0.13 X10(3) UL (ref 0–0.7)
EOSINOPHIL NFR BLD AUTO: 3.1 %
ERYTHROCYTE [DISTWIDTH] IN BLOOD BY AUTOMATED COUNT: 12.7 % (ref 11–15)
FASTING PATIENT LIPID ANSWER: YES
FASTING STATUS PATIENT QL REPORTED: YES
GLOBULIN PLAS-MCNC: 2.2 G/DL (ref 2–3.5)
GLUCOSE BLD-MCNC: 110 MG/DL (ref 70–99)
HCT VFR BLD AUTO: 42.9 %
HDLC SERPL-MCNC: 38 MG/DL (ref 40–59)
HGB BLD-MCNC: 14.6 G/DL
IMM GRANULOCYTES # BLD AUTO: 0.01 X10(3) UL (ref 0–1)
IMM GRANULOCYTES NFR BLD: 0.2 %
LDLC SERPL CALC-MCNC: 64 MG/DL (ref ?–100)
LYMPHOCYTES # BLD AUTO: 1.11 X10(3) UL (ref 1–4)
LYMPHOCYTES NFR BLD AUTO: 26.9 %
MCH RBC QN AUTO: 31.1 PG (ref 26–34)
MCHC RBC AUTO-ENTMCNC: 34 G/DL (ref 31–37)
MCV RBC AUTO: 91.3 FL
MICROALBUMIN UR-MCNC: <0.3 MG/DL
MONOCYTES # BLD AUTO: 0.36 X10(3) UL (ref 0.1–1)
MONOCYTES NFR BLD AUTO: 8.7 %
NEUTROPHILS # BLD AUTO: 2.49 X10 (3) UL (ref 1.5–7.7)
NEUTROPHILS # BLD AUTO: 2.49 X10(3) UL (ref 1.5–7.7)
NEUTROPHILS NFR BLD AUTO: 60.4 %
NONHDLC SERPL-MCNC: 82 MG/DL (ref ?–130)
OSMOLALITY SERPL CALC.SUM OF ELEC: 295 MOSM/KG (ref 275–295)
PLATELET # BLD AUTO: 204 10(3)UL (ref 150–450)
POTASSIUM SERPL-SCNC: 5 MMOL/L (ref 3.5–5.1)
PROT SERPL-MCNC: 7 G/DL (ref 5.7–8.2)
RBC # BLD AUTO: 4.7 X10(6)UL
SODIUM SERPL-SCNC: 142 MMOL/L (ref 136–145)
TRIGL SERPL-MCNC: 94 MG/DL (ref 30–149)
VLDLC SERPL CALC-MCNC: 14 MG/DL (ref 0–30)
WBC # BLD AUTO: 4.1 X10(3) UL (ref 4–11)

## 2025-01-17 PROCEDURE — 85025 COMPLETE CBC W/AUTO DIFF WBC: CPT

## 2025-01-17 PROCEDURE — 80053 COMPREHEN METABOLIC PANEL: CPT

## 2025-01-17 PROCEDURE — 36415 COLL VENOUS BLD VENIPUNCTURE: CPT

## 2025-01-17 PROCEDURE — 82043 UR ALBUMIN QUANTITATIVE: CPT

## 2025-01-17 PROCEDURE — 82570 ASSAY OF URINE CREATININE: CPT

## 2025-01-17 PROCEDURE — 80061 LIPID PANEL: CPT

## 2025-01-29 ENCOUNTER — HOSPITAL ENCOUNTER (OUTPATIENT)
Dept: ULTRASOUND IMAGING | Facility: HOSPITAL | Age: 70
Discharge: HOME | End: 2025-01-29
Attending: INTERNAL MEDICINE
Payer: MEDICARE

## 2025-01-29 DIAGNOSIS — I73.9 PAD (PERIPHERAL ARTERY DISEASE): ICD-10-CM

## 2025-01-29 PROCEDURE — 93923 UPR/LXTR ART STDY 3+ LVLS: CPT | Performed by: INTERNAL MEDICINE

## 2025-02-02 ENCOUNTER — TELEPHONE (OUTPATIENT)
Dept: INTERNAL MEDICINE CLINIC | Facility: CLINIC | Age: 70
End: 2025-02-02

## 2025-02-02 DIAGNOSIS — I73.9 PAD (PERIPHERAL ARTERY DISEASE): Primary | ICD-10-CM

## 2025-02-03 ENCOUNTER — NURSE TRIAGE (OUTPATIENT)
Dept: INTERNAL MEDICINE CLINIC | Facility: CLINIC | Age: 70
End: 2025-02-03

## 2025-02-03 NOTE — TELEPHONE ENCOUNTER
Left message to call back. Please transfer to triage. 1st attempt.  I have also sent patient a Aternityt message to call us back.          Tameka Parra Rn Triage (supporting Frank Chen MD)18 hours ago (11:02 PM)       He has an ear infection with pain. Wants to know if he can  ear drops at pharmacy for ear infection.

## 2025-02-04 NOTE — TELEPHONE ENCOUNTER
Action Requested: Summary for Provider     []  Critical Lab, Recommendations Needed  [] Need Additional Advice  []   FYI    []   Need Orders  [] Need Medications Sent to Pharmacy  []  Other     SUMMARY: Office visit. Daughter will talk to patient and if he is still in pain will make an appointment.     Reason for call: Ear Pain  Onset: Data Unavailable    Daughter states that patient had the flu last week but just got over it. The ear pain was severe 2 days ago but today it is not as bad today. Patient denies fever, redness or swelling. Daughter states patient has stopped complaining about ear pain so she is unsure if he still needs an appointment. She will call him today and will make an appointment if patient still has pain.     Reason for Disposition   All other earaches  (Exceptions: Earache lasting < 1 hour, and earache from air travel.)    Protocols used: Earache-A-[place]

## 2025-02-10 ENCOUNTER — TELEPHONE (OUTPATIENT)
Facility: CLINIC | Age: 70
End: 2025-02-10

## 2025-02-10 NOTE — TELEPHONE ENCOUNTER
1st Letter sent out to patient's home- cs        US LIVER WITH ELASTOGRAPHY(CPT=76705,29438) (Order #[id number]) on 1/8/25

## 2025-02-11 NOTE — TELEPHONE ENCOUNTER
Refill passed [facility] protocol  Requested Prescriptions   Pending Prescriptions Disp Refills    SERTRALINE 50 MG Oral Tab [Pharmacy Med Name: Sertraline HCl 50 MG Oral Tablet] 90 tablet 0     Sig: Take 1 tablet by mouth once daily       Psychiatric Non-Scheduled (Anti-Anxiety) Passed - 2/11/2025  4:10 PM        Passed - In person appointment or virtual visit in the past 6 mos or appointment in next 3 mos     Recent Outpatient Visits              4 weeks ago Medicare annual wellness visit, subsequent    [facility]    1 month ago Chronic hepatitis B (HCC)    [facility] [place] [facility] Sreedhar Hendrix MD    Office Visit    1 year ago Medicare annual wellness visit, subsequent    [facility]    [facility]    2 years ago Medicare annual wellness visit, subsequent    [facility]    [facility]    3 years ago Medicare annual wellness visit, initial    [facility]    [facility]          Future Appointments         Provider Department Appt Notes    In 1 week [address] [facility] scheduled with pt's daughter                    Passed - Depression Screening completed within the past 12 months        Passed - Medication is active on med list           @[email or web address]  [email or web address]

## 2025-02-20 ENCOUNTER — LAB ENCOUNTER (OUTPATIENT)
Dept: LAB | Age: 70
End: 2025-02-20
Attending: INTERNAL MEDICINE
Payer: MEDICARE

## 2025-02-20 DIAGNOSIS — Z12.5 PROSTATE CANCER SCREENING: ICD-10-CM

## 2025-02-20 LAB — COMPLEXED PSA SERPL-MCNC: 1.74 NG/ML (ref ?–4)

## 2025-02-20 PROCEDURE — 36415 COLL VENOUS BLD VENIPUNCTURE: CPT

## 2025-02-24 ENCOUNTER — HOSPITAL ENCOUNTER (OUTPATIENT)
Dept: ULTRASOUND IMAGING | Facility: HOSPITAL | Age: 70
Discharge: HOME | End: 2025-02-24
Attending: INTERNAL MEDICINE
Payer: MEDICARE

## 2025-02-24 DIAGNOSIS — R74.8 ABNORMAL TRANSAMINASES: ICD-10-CM

## 2025-02-24 DIAGNOSIS — B18.1 CHRONIC HEPATITIS B (HCC): ICD-10-CM

## 2025-02-24 PROCEDURE — 76981 USE PARENCHYMA: CPT | Performed by: INTERNAL MEDICINE

## 2025-02-24 PROCEDURE — 76705 ECHO EXAM OF ABDOMEN: CPT | Performed by: INTERNAL MEDICINE

## 2025-02-27 ENCOUNTER — OFFICE VISIT (OUTPATIENT)
Dept: INTERNAL MEDICINE CLINIC | Facility: CLINIC | Age: 70
End: 2025-02-27
Payer: MEDICARE

## 2025-02-27 VITALS
SYSTOLIC BLOOD PRESSURE: 105 MMHG | HEIGHT: 65 IN | WEIGHT: 189 LBS | BODY MASS INDEX: 31.49 KG/M2 | DIASTOLIC BLOOD PRESSURE: 66 MMHG | HEART RATE: 64 BPM

## 2025-02-27 DIAGNOSIS — H65.191 ACUTE MUCOID OTITIS MEDIA OF RIGHT EAR: Primary | ICD-10-CM

## 2025-02-27 PROCEDURE — 3074F SYST BP LT 130 MM HG: CPT | Performed by: NURSE PRACTITIONER

## 2025-02-27 PROCEDURE — 1125F AMNT PAIN NOTED PAIN PRSNT: CPT | Performed by: NURSE PRACTITIONER

## 2025-02-27 PROCEDURE — 1160F RVW MEDS BY RX/DR IN RCRD: CPT | Performed by: NURSE PRACTITIONER

## 2025-02-27 PROCEDURE — 3008F BODY MASS INDEX DOCD: CPT | Performed by: NURSE PRACTITIONER

## 2025-02-27 PROCEDURE — 3061F NEG MICROALBUMINURIA REV: CPT | Performed by: NURSE PRACTITIONER

## 2025-02-27 PROCEDURE — 3078F DIAST BP <80 MM HG: CPT | Performed by: NURSE PRACTITIONER

## 2025-02-27 PROCEDURE — 99213 OFFICE O/P EST LOW 20 MIN: CPT | Performed by: NURSE PRACTITIONER

## 2025-02-27 PROCEDURE — 1159F MED LIST DOCD IN RCRD: CPT | Performed by: NURSE PRACTITIONER

## 2025-02-27 NOTE — PROGRESS NOTES
Tameka Fisher is a 69 year old male.  HPI:   Presents to clinic complaining of right ear pain.  Pain present for 3 weeks.  Denies any cold symptoms, sore throat, hearing loss, dizziness, headache, drainage from ear, fever, chills, recent travel.  NKDA  Current Outpatient Medications   Medication Sig Dispense Refill    amoxicillin clavulanate 875-125 MG Oral Tab Take 1 tablet by mouth 2 (two) times daily for 10 days. 20 tablet 0    sertraline 50 MG Oral Tab Take 1 tablet (50 mg total) by mouth daily. 90 tablet 3    entecavir 0.5 MG Oral Tab Take 1 tablet (0.5 mg total) by mouth daily. 90 tablet 3    omega-3 fatty acids 1000 MG Oral Cap Take 1,000 mg by mouth daily. 90 capsule 3    ezetimibe 10 MG Oral Tab Take 1 tablet (10 mg total) by mouth nightly. 90 tablet 3    atorvastatin 80 MG Oral Tab Take 1 tablet (80 mg total) by mouth nightly. 90 tablet 1    Glucose Blood (LUIS CONTOUR TEST) In Vitro Strip Check glucose daily 100 strip 1    Bisoprolol Fumarate (ZEBETA) 5 MG Oral Tab Take 1 tablet (5 mg total) by mouth daily.      docusate sodium 100 MG Oral Cap Take 2 capsules (200 mg total) by mouth daily.      aspirin 81 MG Oral Tab Take 1 tablet (81 mg total) by mouth daily.        Past Medical History:    Atherosclerosis of coronary artery    Coronary artery disease    Esophageal reflux    Heart disease    Mild depression    Other and unspecified hyperlipidemia    Prediabetes      Social History:  Social History     Socioeconomic History    Marital status:    Tobacco Use    Smoking status: Former     Current packs/day: 0.00     Average packs/day: 1 pack/day for 30.0 years (30.0 ttl pk-yrs)     Types: Cigarettes     Quit date: 2011     Years since quittin.7     Passive exposure: Past    Smokeless tobacco: Never    Tobacco comments:     quit 13 yrs ago    Vaping Use    Vaping status: Never Used   Substance and Sexual Activity    Alcohol use: Not Currently     Alcohol/week: 7.0 standard drinks of  alcohol     Comment: 0    Drug use: No    Sexual activity: Yes     Partners: Female     Social Drivers of Health     Food Insecurity: No Food Insecurity (1/13/2025)    NCSS - Food Insecurity     Worried About Running Out of Food in the Last Year: No     Ran Out of Food in the Last Year: No   Transportation Needs: No Transportation Needs (1/13/2025)    NCSS - Transportation     Lack of Transportation: No   Housing Stability: Not At Risk (1/13/2025)    NCSS - Housing/Utilities     Has Housing: Yes     Worried About Losing Housing: No     Unable to Get Utilities: No        REVIEW OF SYSTEMS:   Review of Systems   Constitutional:  Negative for activity change, appetite change, chills, diaphoresis, fatigue, fever and unexpected weight change.   HENT:  Positive for ear pain. Negative for congestion, ear discharge, facial swelling, hearing loss, mouth sores, nosebleeds, postnasal drip, rhinorrhea, sinus pressure, sinus pain, sneezing, sore throat, tinnitus, trouble swallowing and voice change.    Eyes:  Negative for photophobia, discharge and visual disturbance.   Respiratory:  Negative for cough, chest tightness, shortness of breath and wheezing.    Cardiovascular:  Negative for chest pain, palpitations and leg swelling.   Gastrointestinal:  Negative for abdominal pain, constipation, diarrhea, nausea and vomiting.   Endocrine: Negative.    Genitourinary:  Negative for difficulty urinating and frequency.   Musculoskeletal:  Negative for arthralgias and back pain.   Skin: Negative.    Neurological:  Negative for dizziness, seizures, numbness and headaches.   Psychiatric/Behavioral: Negative.            EXAM:   /66 (BP Location: Right arm, Patient Position: Sitting, Cuff Size: large)   Pulse 64   Ht 5' 5\" (1.651 m)   Wt 189 lb (85.7 kg)   BMI 31.45 kg/m²     Physical Exam  Vitals reviewed.   Constitutional:       General: He is not in acute distress.     Appearance: Normal appearance. He is not ill-appearing.    HENT:      Head: Normocephalic.      Right Ear: Swelling and tenderness present.      Left Ear: Tympanic membrane, ear canal and external ear normal.      Ears:      Comments: TM not visible  Mucoid presence     Nose: Nose normal. No congestion or rhinorrhea.      Mouth/Throat:      Mouth: Mucous membranes are moist.      Pharynx: Oropharynx is clear. No oropharyngeal exudate or posterior oropharyngeal erythema.   Eyes:      General: No scleral icterus.        Right eye: No discharge.         Left eye: No discharge.      Conjunctiva/sclera: Conjunctivae normal.      Pupils: Pupils are equal, round, and reactive to light.   Cardiovascular:      Rate and Rhythm: Normal rate and regular rhythm.      Pulses: Normal pulses.      Heart sounds: Normal heart sounds.   Pulmonary:      Effort: Pulmonary effort is normal.      Breath sounds: Normal breath sounds.   Musculoskeletal:      Cervical back: Normal range of motion and neck supple. No rigidity or tenderness.   Lymphadenopathy:      Cervical: No cervical adenopathy.   Skin:     General: Skin is warm.      Coloration: Skin is not jaundiced.   Neurological:      Mental Status: He is alert and oriented to person, place, and time.   Psychiatric:         Thought Content: Thought content normal.         Judgment: Judgment normal.            ASSESSMENT AND PLAN:     Assessment & Plan  Acute mucoid otitis media of right ear  Start Augmentin.  NKDA.  Probiotics advised  Hydrate, Tylenol 500 mg every 6-8 hours as needed.  Close follow-up in 7 to 10 days for ear check.  Orders:    amoxicillin clavulanate 875-125 MG Oral Tab; Take 1 tablet by mouth 2 (two) times daily for 10 days.         The patient indicates understanding of these issues and agrees to the plan.  The patient is asked to return in 1 week with  myself or PCP.     The above note was creating using Dragon speech recognition technology. Please excuse any typos.

## 2025-03-03 ENCOUNTER — OFFICE VISIT (OUTPATIENT)
Dept: SURGERY | Facility: CLINIC | Age: 70
End: 2025-03-03
Payer: MEDICARE

## 2025-03-03 ENCOUNTER — TELEPHONE (OUTPATIENT)
Dept: SURGERY | Facility: CLINIC | Age: 70
End: 2025-03-03

## 2025-03-03 VITALS
BODY MASS INDEX: 31 KG/M2 | WEIGHT: 189 LBS | HEART RATE: 76 BPM | DIASTOLIC BLOOD PRESSURE: 59 MMHG | SYSTOLIC BLOOD PRESSURE: 119 MMHG

## 2025-03-03 DIAGNOSIS — D17.1 LIPOMA OF TORSO: ICD-10-CM

## 2025-03-03 DIAGNOSIS — L30.9 ECZEMA, UNSPECIFIED TYPE: Primary | ICD-10-CM

## 2025-03-03 PROCEDURE — 1125F AMNT PAIN NOTED PAIN PRSNT: CPT | Performed by: SURGERY

## 2025-03-03 PROCEDURE — 99204 OFFICE O/P NEW MOD 45 MIN: CPT | Performed by: SURGERY

## 2025-03-03 PROCEDURE — 1160F RVW MEDS BY RX/DR IN RCRD: CPT | Performed by: SURGERY

## 2025-03-03 PROCEDURE — 3074F SYST BP LT 130 MM HG: CPT | Performed by: SURGERY

## 2025-03-03 PROCEDURE — 1170F FXNL STATUS ASSESSED: CPT | Performed by: SURGERY

## 2025-03-03 PROCEDURE — 3078F DIAST BP <80 MM HG: CPT | Performed by: SURGERY

## 2025-03-03 PROCEDURE — 1159F MED LIST DOCD IN RCRD: CPT | Performed by: SURGERY

## 2025-03-03 RX ORDER — DESONIDE 0.5 MG/G
1 CREAM TOPICAL 2 TIMES DAILY
Qty: 15 G | Refills: 0 | Status: SHIPPED | OUTPATIENT
Start: 2025-03-03 | End: 2025-03-17

## 2025-03-03 NOTE — H&P
History and Physical      HPI       HPI  Tameka Fisher is a 69 year old male who presents with thigh mass x 3 patient with chronic hepatitis B.    Past Medical History:    Atherosclerosis of coronary artery    Coronary artery disease    Esophageal reflux    Heart disease    Mild depression    Other and unspecified hyperlipidemia    Prediabetes     Past Surgical History:   Procedure Laterality Date    Cabg      Colonoscopy      ; hyperplastic rectal polyp; sigmoid diverticulosis; int hemorrhoids     Current Outpatient Medications   Medication Sig Dispense Refill    amoxicillin clavulanate 875-125 MG Oral Tab Take 1 tablet by mouth 2 (two) times daily for 10 days. 20 tablet 0    sertraline 50 MG Oral Tab Take 1 tablet (50 mg total) by mouth daily. 90 tablet 3    entecavir 0.5 MG Oral Tab Take 1 tablet (0.5 mg total) by mouth daily. 90 tablet 3    omega-3 fatty acids 1000 MG Oral Cap Take 1,000 mg by mouth daily. 90 capsule 3    ezetimibe 10 MG Oral Tab Take 1 tablet (10 mg total) by mouth nightly. 90 tablet 3    atorvastatin 80 MG Oral Tab Take 1 tablet (80 mg total) by mouth nightly. 90 tablet 1    Glucose Blood (LUIS CONTOUR TEST) In Vitro Strip Check glucose daily 100 strip 1    Bisoprolol Fumarate (ZEBETA) 5 MG Oral Tab Take 1 tablet (5 mg total) by mouth daily.      docusate sodium 100 MG Oral Cap Take 2 capsules (200 mg total) by mouth daily.      aspirin 81 MG Oral Tab Take 1 tablet (81 mg total) by mouth daily.       ALLERGIES  Allergies[1]    Social History     Socioeconomic History    Marital status:    Tobacco Use    Smoking status: Former     Current packs/day: 0.00     Average packs/day: 1 pack/day for 30.0 years (30.0 ttl pk-yrs)     Types: Cigarettes     Quit date: 2011     Years since quittin.7     Passive exposure: Past    Smokeless tobacco: Never    Tobacco comments:     quit 13 yrs ago    Vaping Use    Vaping status: Never Used   Substance and Sexual Activity    Alcohol  use: Not Currently     Alcohol/week: 7.0 standard drinks of alcohol     Comment: 0    Drug use: No    Sexual activity: Yes     Partners: Female     Family History   Problem Relation Age of Onset    Heart Disease Father     Diabetes Father     No Known Problems Mother     Cancer Sister         breast cancer    No Known Problems Brother        Review of Systems   A comprehensive 10 point review of systems was completed.  Pertinent positives and negatives noted in the the HPI.    PHYSICAL EXAM   There were no vitals taken for this visit. No LMP for male patient.   Constitutional: appears well hydrated alert and responsive no acute distress noted  Head/Face: normocephalic  Nose/Mouth/Throat: nose and throat are clear palate is intact mucous membranes are moist no oral lesions are noted  Neck/Thyroid: neck is supple without adenopathy  Respiratory: normal to inspection lungs are clear to auscultation bilaterally normal respiratory effort  Cardiovascular: regular rate and rhythm no murmurs, gallups, or rubs  Abdomen: soft non-tender non-distended no organomegaly noted no masses no inguinal hernias  Extremities: no edema, cyanosis, or clubbing    3 different lipomas of the right groin posterior thigh and medial thigh.  Ranging in size from 4 cm to 2 cm    2 x 2 cm area of eczema right hand  Neurological: exam appropriate for age reflexes and motor skills appropriate for age      ASSESSMENT/PLAN   Assessment       69 year old male with multiple lipomas, eczema right hand  We have discussed the surgical risks, benefits, alternatives, and expected recovery. We will plan excision lipomas with sedation, desonide ointment right hand twice a day for 2. All of the patient's questions have been answered to his satisfaction.       3/3/2025  Alexis Jeffers MD               [1] No Known Allergies

## 2025-03-03 NOTE — PATIENT INSTRUCTIONS
Stop aspirin ibuprofen and fish oil 7 days prior to surgery.    Day surgery will call with further instructions.    Fill prescription and start steroid cream to the right hand area twice a day for 2 weeks

## 2025-03-03 NOTE — TELEPHONE ENCOUNTER
Pt's daughter called.  Appointment today 3-3-25.  Question regarding surgery.  Anesthesia.  Surgery date.  Please call

## 2025-03-07 ENCOUNTER — TELEPHONE (OUTPATIENT)
Dept: INTERNAL MEDICINE CLINIC | Facility: CLINIC | Age: 70
End: 2025-03-07

## 2025-03-07 NOTE — TELEPHONE ENCOUNTER
Daughter/Renata (Last signed Verbal Release verified) called states patient saw Dr. Alexis Jeffers on 3/3/25 for lipoma of torso and 2 lumps on the back of his right thigh. She states patient is concerned as sedation will be administered [see scheduled surgery it indicates general anesthesia] and states patient has a significant cardiac history and he is nervous if his heart will not handle the surgery. The procedure is scheduled for 3/13/25. Patient would like to have a pre-op visit, asking the soonest visit before noon. He only wants to see Dr. Chen for pre-op visit. If any labs need to be ordered, she is asking that orders are placed. Patient will also canelo out to [facility] for cardiac clearance. I made her aware I will convey the above to Dr. Chen. She verbalized understanding. No further questions or concerns at this time.    Dr. Chen  - please advise, can patient be added for pre-op visit [surgery is scheduled on 3/13/25], also please advise if labs can be placed prior to visit. Thank you.

## 2025-03-07 NOTE — TELEPHONE ENCOUNTER
Talked to Fernando (ANA) appointment made, left message to call back also to Renata. But Fernando mom will relay message.

## 2025-03-10 ENCOUNTER — TELEPHONE (OUTPATIENT)
Dept: SURGERY | Facility: CLINIC | Age: 70
End: 2025-03-10

## 2025-03-10 ENCOUNTER — OFFICE VISIT (OUTPATIENT)
Dept: INTERNAL MEDICINE CLINIC | Facility: CLINIC | Age: 70
End: 2025-03-10
Payer: MEDICARE

## 2025-03-10 ENCOUNTER — TELEPHONE (OUTPATIENT)
Dept: INTERNAL MEDICINE CLINIC | Facility: CLINIC | Age: 70
End: 2025-03-10

## 2025-03-10 VITALS
BODY MASS INDEX: 32 KG/M2 | HEART RATE: 73 BPM | DIASTOLIC BLOOD PRESSURE: 70 MMHG | WEIGHT: 193.06 LBS | SYSTOLIC BLOOD PRESSURE: 124 MMHG

## 2025-03-10 DIAGNOSIS — I25.10 CORONARY ARTERY DISEASE INVOLVING NATIVE CORONARY ARTERY OF NATIVE HEART WITHOUT ANGINA PECTORIS: Primary | ICD-10-CM

## 2025-03-10 DIAGNOSIS — H92.01 RIGHT EAR PAIN: Primary | ICD-10-CM

## 2025-03-10 PROCEDURE — 3074F SYST BP LT 130 MM HG: CPT | Performed by: INTERNAL MEDICINE

## 2025-03-10 PROCEDURE — 1160F RVW MEDS BY RX/DR IN RCRD: CPT | Performed by: INTERNAL MEDICINE

## 2025-03-10 PROCEDURE — 99212 OFFICE O/P EST SF 10 MIN: CPT | Performed by: INTERNAL MEDICINE

## 2025-03-10 PROCEDURE — 1159F MED LIST DOCD IN RCRD: CPT | Performed by: INTERNAL MEDICINE

## 2025-03-10 PROCEDURE — 3078F DIAST BP <80 MM HG: CPT | Performed by: INTERNAL MEDICINE

## 2025-03-10 RX ORDER — ENALAPRIL MALEATE 2.5 MG/1
2.5 TABLET ORAL DAILY
COMMUNITY
Start: 2025-02-07

## 2025-03-10 NOTE — TELEPHONE ENCOUNTER
Patients daughter Renata calling to cancel procedure on 3/13 due to still needing cardiac clearance. Patients appointment with cardiology is scheduled on 3/12. Patients daughter will call back once ready reschedule, thanks.

## 2025-03-10 NOTE — TELEPHONE ENCOUNTER
Future Appointments   Date Time Provider Department Center   3/10/2025  3:30 PM Frank Chen MD [facility]   3/27/2025 10:00 [facility]

## 2025-03-10 NOTE — PROGRESS NOTES
Subjective:     Patient ID: Tameka Fisher is a 69 year old male.    HPI    History/Other:   Review of Systems   Constitutional: Negative.  Negative for fever.   HENT:  Positive for ear pain and hearing loss. Negative for ear discharge.    Eyes: Negative.      Current Outpatient Medications   Medication Sig Dispense Refill    enalapril 2.5 MG Oral Tab Take 1 tablet (2.5 mg total) by mouth daily.      desonide 0.05 % External Cream Apply 1 Tube topically 2 (two) times daily for 14 days. 15 g 0    sertraline 50 MG Oral Tab Take 1 tablet (50 mg total) by mouth daily. 90 tablet 3    entecavir 0.5 MG Oral Tab Take 1 tablet (0.5 mg total) by mouth daily. 90 tablet 3    omega-3 fatty acids 1000 MG Oral Cap Take 1,000 mg by mouth daily. 90 capsule 3    ezetimibe 10 MG Oral Tab Take 1 tablet (10 mg total) by mouth nightly. 90 tablet 3    atorvastatin 80 MG Oral Tab Take 1 tablet (80 mg total) by mouth nightly. 90 tablet 1    Glucose Blood (LUIS CONTOUR TEST) In Vitro Strip Check glucose daily 100 strip 1    Bisoprolol Fumarate (ZEBETA) 5 MG Oral Tab Take 1 tablet (5 mg total) by mouth daily.      docusate sodium 100 MG Oral Cap Take 2 capsules (200 mg total) by mouth daily.      aspirin 81 MG Oral Tab Take 1 tablet (81 mg total) by mouth daily.       Allergies:Allergies[1]    Past Medical History:    Atherosclerosis of coronary artery    Coronary artery disease    Esophageal reflux    Heart disease    Mild depression    Other and unspecified hyperlipidemia    Prediabetes      Past Surgical History:   Procedure Laterality Date    Cabg  2011    Colonoscopy      2014; hyperplastic rectal polyp; sigmoid diverticulosis; int hemorrhoids      Family History   Problem Relation Age of Onset    Heart Disease Father     Diabetes Father     No Known Problems Mother     Cancer Sister         breast cancer    No Known Problems Brother       Social History:   Social History     Socioeconomic History    Marital status:    Tobacco  Use    Smoking status: Former     Current packs/day: 0.00     Average packs/day: 1 pack/day for 30.0 years (30.0 ttl pk-yrs)     Types: Cigarettes     Quit date: 2011     Years since quittin.8     Passive exposure: Past    Smokeless tobacco: Never    Tobacco comments:     quit 13 yrs ago    Vaping Use    Vaping status: Never Used   Substance and Sexual Activity    Alcohol use: Not Currently     Alcohol/week: 7.0 standard drinks of alcohol     Comment: 0    Drug use: No    Sexual activity: Yes     Partners: Female     Social Drivers of Health     Food Insecurity: No Food Insecurity (2025)    NCSS - Food Insecurity     Worried About Running Out of Food in the Last Year: No     Ran Out of Food in the Last Year: No   Transportation Needs: No Transportation Needs (2025)    NCSS - Transportation     Lack of Transportation: No   Housing Stability: Not At Risk (2025)    NCSS - Housing/Utilities     Has Housing: Yes     Worried About Losing Housing: No     Unable to Get Utilities: No        Objective:   Physical Exam  Constitutional:       General: He is not in acute distress.     Appearance: He is not ill-appearing, toxic-appearing or diaphoretic.   HENT:      Right Ear: Tympanic membrane, ear canal and external ear normal.      Left Ear: Tympanic membrane, ear canal and external ear normal.   Musculoskeletal:      Cervical back: Normal range of motion and neck supple. No rigidity or tenderness.   Lymphadenopathy:      Cervical: No cervical adenopathy.   Neurological:      Mental Status: He is alert.         Assessment & Plan:   (H92.01) Right ear pain  (primary encounter diagnosis)  Plan: ENT Referral - In Network        Right TM only partially visible due to some cerumen; since he is still having pain and decreased hearing sensation, I told pt to see ENT. Referral given .       No orders of the defined types were placed in this encounter.      Meds This Visit:  Requested Prescriptions      No  prescriptions requested or ordered in this encounter       Imaging & Referrals:  None            [1] No Known Allergies

## 2025-03-17 ENCOUNTER — TELEPHONE (OUTPATIENT)
Facility: CLINIC | Age: 70
End: 2025-03-17

## 2025-03-17 NOTE — TELEPHONE ENCOUNTER
----- Message from Sreedhar Hendrix sent at 3/16/2025  2:04 PM CDT -----  GI RNs, please recall for AFP and liver ultrasound in 6 months

## 2025-03-17 NOTE — TELEPHONE ENCOUNTER
6 month lab and ultrasound recall entered into patient outreach in [facility].  Due around 8/24/2025.

## 2025-03-27 ENCOUNTER — OFFICE VISIT (OUTPATIENT)
Dept: OTOLARYNGOLOGY | Facility: CLINIC | Age: 70
End: 2025-03-27
Payer: MEDICARE

## 2025-03-27 VITALS — WEIGHT: 193 LBS | HEIGHT: 65 IN | BODY MASS INDEX: 32.15 KG/M2

## 2025-03-27 DIAGNOSIS — H92.01 RIGHT EAR PAIN: Primary | ICD-10-CM

## 2025-03-27 DIAGNOSIS — H61.21 IMPACTED CERUMEN OF RIGHT EAR: ICD-10-CM

## 2025-03-27 PROCEDURE — 99213 OFFICE O/P EST LOW 20 MIN: CPT | Performed by: STUDENT IN AN ORGANIZED HEALTH CARE EDUCATION/TRAINING PROGRAM

## 2025-03-27 PROCEDURE — 69210 REMOVE IMPACTED EAR WAX UNI: CPT | Performed by: STUDENT IN AN ORGANIZED HEALTH CARE EDUCATION/TRAINING PROGRAM

## 2025-03-27 PROCEDURE — 1160F RVW MEDS BY RX/DR IN RCRD: CPT | Performed by: STUDENT IN AN ORGANIZED HEALTH CARE EDUCATION/TRAINING PROGRAM

## 2025-03-27 PROCEDURE — 3008F BODY MASS INDEX DOCD: CPT | Performed by: STUDENT IN AN ORGANIZED HEALTH CARE EDUCATION/TRAINING PROGRAM

## 2025-03-27 PROCEDURE — 1159F MED LIST DOCD IN RCRD: CPT | Performed by: STUDENT IN AN ORGANIZED HEALTH CARE EDUCATION/TRAINING PROGRAM

## 2025-03-27 NOTE — PROGRESS NOTES
[place]  OTOLARYNGOLOGY - HEAD & NECK SURGERY    3/27/2025     Reason for Consultation:   Right ear pain, decreased hearing    History of Present Illness:   Patient is a pleasant 69 year old male who is being seen for right ear pain and decreased hearing for which he saw Dr. Chen.  Patient states that he was having ear pain without any drainage.  He states that he does not typically get any ear pain or any ear issues.  Overall he does hear well.  Has also had some fullness in his ear.  He states that however today he feels quite good and he feels as though his symptoms have resolved.  He still hoping to get his ears checked out.  Has not had any previous ear surgery, denies any vertigo.    Past Medical History  Past Medical History:    Atherosclerosis of coronary artery    Coronary artery disease    Esophageal reflux    Heart disease    Mild depression    Other and unspecified hyperlipidemia    Prediabetes       Past Surgical History  Past Surgical History:   Procedure Laterality Date    Cabg  2011    Colonoscopy      2014; hyperplastic rectal polyp; sigmoid diverticulosis; int hemorrhoids       Family History  Family History   Problem Relation Age of Onset    Heart Disease Father     Diabetes Father     No Known Problems Mother     Cancer Sister         breast cancer    No Known Problems Brother        Social History  Pediatric History   Patient Parents    Not on file     Other Topics Concern    Not on file   Social History Narrative    Not on file           Current Medications:  Current Outpatient Medications   Medication Sig Dispense Refill    enalapril 2.5 MG Oral Tab Take 1 tablet (2.5 mg total) by mouth daily.      sertraline 50 MG Oral Tab Take 1 tablet (50 mg total) by mouth daily. 90 tablet 3    entecavir 0.5 MG Oral Tab Take 1 tablet (0.5 mg total) by mouth daily. 90 tablet 3    omega-3 fatty acids 1000 MG Oral Cap Take 1,000 mg by mouth daily. 90 capsule 3    ezetimibe 10 MG Oral Tab Take 1 tablet  (10 mg total) by mouth nightly. 90 tablet 3    atorvastatin 80 MG Oral Tab Take 1 tablet (80 mg total) by mouth nightly. 90 tablet 1    Glucose Blood (LUIS CONTOUR TEST) In Vitro Strip Check glucose daily 100 strip 1    Bisoprolol Fumarate (ZEBETA) 5 MG Oral Tab Take 1 tablet (5 mg total) by mouth daily.      docusate sodium 100 MG Oral Cap Take 2 capsules (200 mg total) by mouth daily.      aspirin 81 MG Oral Tab Take 1 tablet (81 mg total) by mouth daily.         Allergies  Allergies[1]    Review of Systems:   A comprehensive 10 point review of systems was completed.  Pertinent positives and negatives noted in the the HPI.    Physical Exam:   Height 5' 5\" (1.651 m), weight 193 lb (87.5 kg).    GENERAL: No acute distress, Comfortable appearing  FACE: HB 1/6, Normal Animation  HEAD: Normocephalic  EYES: EOMI, pupils equil  EARS: Bilateral Auricles Symmetric  NOSE: Nares patent bilaterally  ORAL CAVITY: Tongue mobile, Oropharynx clear, Floor of mouth clear, Posterior oropharynx normal  NECK: No palpable lymphadenopathy, thyroid not palpable, nontender    OTOMICROSCOPY WITH CERUMEN REMOVAL    Canals:  Right: Canal with deep cerumen preventing adequate view of TM, debrided with instrumentation as dictated below  Left: Canal clear     Tympanic Membranes:  Right: Normal tympanic membrane.   Left: Normal tympanic membrane.     TM Visualized Method:   Right TM examined via otomicroscopy.    Left TM examined via otomicroscopy.      PROCEDURE: REMOVAL OF CERUMEN IMPACTION  The cerumen impaction was completely removed from the right ear canal using microscopy as necessary.   Removal was completed by using a alligator, suction, and pick      Results:     Laboratory Data:  Lab Results   Component Value Date    WBC 4.1 01/17/2025    HGB 14.6 01/17/2025    HCT 42.9 01/17/2025    .0 01/17/2025    CREATSERUM 0.78 01/17/2025    BUN 15 01/17/2025     01/17/2025    K 5.0 01/17/2025     01/17/2025    CO2 29.0  01/17/2025     (H) 01/17/2025    CA 9.8 01/17/2025    ALB 4.8 01/17/2025    ALKPHO 69 01/17/2025    TP 7.0 01/17/2025    AST 28 01/17/2025    ALT 33 01/17/2025    PSA 2.42 01/27/2022         Imaging:  No results found.      Impression:       ICD-10-CM    1. Right ear pain  H92.01       2. Impacted cerumen of right ear  H61.21            Recommendations:  I was able to completely disimpact the right ear canal which may have been causing his discomfort and ear fullness.  His exam is otherwise unremarkable and tympanic membrane appears normal.  He will return to see me if he has any recurrence of his symptoms.    Thank you for allowing me to participate in the care of your patient.    Laron Jo, DO   Otolaryngology/Rhinology, Sinus, and Endoscopic Skull Base Surgery  [address] [place] [id number]  Phone [phone]  Fax [phone]  3/27/2025  2:57 PM  3/27/2025          [1] No Known Allergies

## 2025-07-08 ENCOUNTER — TELEPHONE (OUTPATIENT)
Dept: INTERNAL MEDICINE CLINIC | Facility: CLINIC | Age: 70
End: 2025-07-08

## 2025-07-09 RX ORDER — ATORVASTATIN CALCIUM 80 MG/1
80 TABLET, FILM COATED ORAL NIGHTLY
Qty: 90 TABLET | Refills: 1 | OUTPATIENT
Start: 2025-07-09

## 2025-07-09 RX ORDER — ENALAPRIL MALEATE 2.5 MG/1
2.5 TABLET ORAL DAILY
Refills: 0 | OUTPATIENT
Start: 2025-07-09

## 2025-07-09 NOTE — TELEPHONE ENCOUNTER
Patient daughter called to request refill for     Bisoprolol Fumarate (ZEBETA) 5 MG Oral Tab   enalapril 2.5 MG Oral Tab     atorvastatin 80 MG Oral Tab   [place] and needs all refills       [facility] [address] [place] [address] [phone], [phone]

## 2025-07-09 NOTE — TELEPHONE ENCOUNTER
Unable to reach x2 for med adherence consult; will send [place] [facility] message. Closing encounter.

## 2025-07-10 RX ORDER — BISOPROLOL FUMARATE 5 MG/1
5 TABLET, FILM COATED ORAL DAILY
Qty: 90 TABLET | Refills: 3 | Status: SHIPPED | OUTPATIENT
Start: 2025-07-10

## 2025-07-10 RX ORDER — ENALAPRIL MALEATE 2.5 MG/1
2.5 TABLET ORAL DAILY
Qty: 90 TABLET | Refills: 3 | Status: SHIPPED | OUTPATIENT
Start: 2025-07-10

## 2025-07-10 RX ORDER — ATORVASTATIN CALCIUM 80 MG/1
80 TABLET, FILM COATED ORAL NIGHTLY
Qty: 90 TABLET | Refills: 3 | Status: SHIPPED | OUTPATIENT
Start: 2025-07-10

## 2025-07-11 NOTE — TELEPHONE ENCOUNTER
For replies, please route to pool: [facility] CENTRAL REFILLS    Please review: medication fails/has no protocol attached.  No future appointments with primary care medicine

## 2025-07-12 RX ORDER — CHLORAL HYDRATE 500 MG
1000 CAPSULE ORAL DAILY
Qty: 90 CAPSULE | Refills: 3 | Status: SHIPPED | OUTPATIENT
Start: 2025-07-12

## 2025-07-14 RX ORDER — EZETIMIBE 10 MG/1
10 TABLET ORAL NIGHTLY
Qty: 90 TABLET | Refills: 3 | Status: SHIPPED | OUTPATIENT
Start: 2025-07-14

## 2025-07-14 NOTE — TELEPHONE ENCOUNTER
Refill passed [facility] protocol.

## 2025-08-18 ENCOUNTER — TELEPHONE (OUTPATIENT)
Facility: CLINIC | Age: 70
End: 2025-08-18

## 2025-08-18 DIAGNOSIS — B18.1 CHRONIC HEPATITIS B (HCC): Primary | ICD-10-CM

## 2025-08-28 ENCOUNTER — LAB ENCOUNTER (OUTPATIENT)
Dept: LAB | Age: 70
End: 2025-08-28
Attending: INTERNAL MEDICINE

## 2025-08-28 DIAGNOSIS — B18.1 CHRONIC HEPATITIS B (HCC): ICD-10-CM

## 2025-08-28 LAB
AFP-TM SERPL-MCNC: 5.4 NG/ML (ref ?–8)
ALBUMIN SERPL-MCNC: 4.6 G/DL (ref 3.2–4.8)
ALBUMIN/GLOB SERPL: 2.2 (ref 1–2)
ALP LIVER SERPL-CCNC: 72 U/L (ref 45–117)
ALT SERPL-CCNC: 33 U/L (ref 10–49)
ANION GAP SERPL CALC-SCNC: 8 MMOL/L (ref 0–18)
AST SERPL-CCNC: 26 U/L (ref ?–34)
BILIRUB SERPL-MCNC: 0.7 MG/DL (ref 0.2–1.1)
BUN BLD-MCNC: 14 MG/DL (ref 9–23)
BUN/CREAT SERPL: 18.7 (ref 10–20)
CALCIUM BLD-MCNC: 9.2 MG/DL (ref 8.7–10.4)
CHLORIDE SERPL-SCNC: 102 MMOL/L (ref 98–112)
CO2 SERPL-SCNC: 30 MMOL/L (ref 21–32)
CREAT BLD-MCNC: 0.75 MG/DL (ref 0.7–1.3)
EGFRCR SERPLBLD CKD-EPI 2021: 98 ML/MIN/1.73M2 (ref 60–?)
FASTING STATUS PATIENT QL REPORTED: YES
GLOBULIN PLAS-MCNC: 2.1 G/DL (ref 2–3.5)
GLUCOSE BLD-MCNC: 118 MG/DL (ref 70–99)
OSMOLALITY SERPL CALC.SUM OF ELEC: 292 MOSM/KG (ref 275–295)
POTASSIUM SERPL-SCNC: 4.6 MMOL/L (ref 3.5–5.1)
PROT SERPL-MCNC: 6.7 G/DL (ref 5.7–8.2)
SODIUM SERPL-SCNC: 140 MMOL/L (ref 136–145)

## 2025-08-28 PROCEDURE — 36415 COLL VENOUS BLD VENIPUNCTURE: CPT

## 2025-08-28 PROCEDURE — 80053 COMPREHEN METABOLIC PANEL: CPT

## 2025-08-28 PROCEDURE — 82105 ALPHA-FETOPROTEIN SERUM: CPT

## (undated) NOTE — LETTER
2/10/2025          Tameka Fisher    [address]    [facility] [address]         Dear Tameka,    Our records indicate that the tests ordered for you by Sreedhar Hendrix MD  have not been done.  If you have, in fact, already completed the tests or you do not wish to have the tests done, please contact our office at THE NUMBER LISTED BELOW.  Otherwise, please proceed with the testing.  Enclosed is a duplicate order for your convenience.    US LIVER WITH ELASTOGRAPHY(CPT=76705,10591) (Order #[id number]) on 1/8/25         Sincerely,    Sreedhar Hendrix MD  [address]  [facility] [id number]  [phone]

## (undated) NOTE — LETTER
02/02/22        Tameka Nino  [address] [facility]      Dear Mariam Williamson,    Our records indicate that you have outstanding lab work and or testing that was ordered for you and has not yet been completed:  Orders Placed This Encounter

## (undated) NOTE — LETTER
4/15/2020        To Whom It May Concern:      This is to certify that Mr Aye Moreno is our patient and under our care. Due to his medical condition, he is considered to be high risk for covid 19 infection.  Given the current covid 19 pandemic, we ask

## (undated) NOTE — LETTER
2/10/2025          Tameka Fisher    [address]    [facility] [address]         Dear Tameka,    Our records indicate that the tests ordered for you by Sreedhar Hendrix MD  have not been done.  If you have, in fact, already completed the tests or you do not wish to have the tests done, please contact our office at THE NUMBER LISTED BELOW.  Otherwise, please proceed with the testing.  Enclosed is a duplicate order for your convenience.          US LIVER WITH ELASTOGRAPHY(CPT=76705,99767) (Order #[id number]) on 1/8/25           Sincerely,    Sreedhar Hendrix MD  [facility]  [address] [facility] [address]  [facility] [id number] [phone]        Document electronically generated by:  Janell LAWRENCE CMA